# Patient Record
Sex: FEMALE | Race: WHITE | Employment: OTHER | URBAN - METROPOLITAN AREA
[De-identification: names, ages, dates, MRNs, and addresses within clinical notes are randomized per-mention and may not be internally consistent; named-entity substitution may affect disease eponyms.]

---

## 2018-09-18 ENCOUNTER — APPOINTMENT (OUTPATIENT)
Dept: NON INVASIVE DIAGNOSTICS | Age: 68
End: 2018-09-18
Attending: INTERNAL MEDICINE
Payer: MEDICARE

## 2018-09-18 ENCOUNTER — HOSPITAL ENCOUNTER (OUTPATIENT)
Dept: NON INVASIVE DIAGNOSTICS | Age: 68
Discharge: HOME | End: 2018-09-18
Attending: INTERNAL MEDICINE
Payer: MEDICARE

## 2018-09-18 DIAGNOSIS — R94.31 ABNORMAL EKG: ICD-10-CM

## 2018-09-18 LAB
ATTENDING PHYSICIAN, CST07: NORMAL
DIAGNOSIS, 93000: NORMAL
DUKE TM SCORE RESULT, CST14: NORMAL
DUKE TREADMILL SCORE, CST13: 5456
ECG INTERP BEFORE EX, CST11: NORMAL
ECG INTERP DURING EX, CST12: NORMAL
FUNCTIONAL CAPACITY, CST17: NORMAL
KNOWN CARDIAC CONDITION, CST08: NORMAL
MAX. DIASTOLIC BP, CST04: 80 MMHG
MAX. HEART RATE, CST05: 136 BPM
MAX. SYSTOLIC BP, CST03: 168 MMHG
OVERALL BP RESPONSE TO EXERCISE, CST16: NORMAL
OVERALL HR RESPONSE TO EXERCISE, CST15: NORMAL
PEAK EX METS, CST10: 6.3 METS
PROTOCOL NAME, CST01: NORMAL
REASON FOR TERMINATION: 86 BPM
TEST INDICATION, CST09: NORMAL
TIME IN EXERCISE PHASE, CST02: NORMAL

## 2018-09-18 PROCEDURE — 93351 STRESS TTE COMPLETE: CPT

## 2020-01-14 ENCOUNTER — HOSPITAL ENCOUNTER (OUTPATIENT)
Dept: MRI IMAGING | Age: 70
Discharge: HOME | End: 2020-01-14
Attending: INTERNAL MEDICINE
Payer: MEDICARE

## 2020-01-14 DIAGNOSIS — R41.3 MEMORY LOSS: ICD-10-CM

## 2020-01-14 DIAGNOSIS — S09.90XS PERSONALITY CHANGE DUE TO HEAD INJURY: ICD-10-CM

## 2020-01-14 DIAGNOSIS — F07.0 PERSONALITY CHANGE DUE TO HEAD INJURY: ICD-10-CM

## 2020-01-14 PROCEDURE — 70551 MRI BRAIN STEM W/O DYE: CPT

## 2020-02-06 ENCOUNTER — TELEPHONE (OUTPATIENT)
Dept: NEUROLOGY | Age: 70
End: 2020-02-06

## 2020-02-06 NOTE — TELEPHONE ENCOUNTER
----- Message from Casey Owens sent at 2/6/2020 10:58 AM EST -----  Regarding: [facility] /MD/TELEPHONE  Contact: [phone]  Caller's first and last name: Baljinder Favorite (daughter)  Reason for call: N/A  Callback required yes/no and why: Yes  Best contact number(s): [phone]  Details to clarify the request: Daughter  would like to discuss a letter written to Dr. Uziel Arizmendi from Dr. Rober Mark.

## 2020-02-11 ENCOUNTER — OFFICE VISIT (OUTPATIENT)
Dept: NEUROLOGY | Age: 70
End: 2020-02-11

## 2020-02-11 VITALS
DIASTOLIC BLOOD PRESSURE: 60 MMHG | HEART RATE: 74 BPM | SYSTOLIC BLOOD PRESSURE: 108 MMHG | WEIGHT: 162 LBS | HEIGHT: 66 IN | OXYGEN SATURATION: 98 % | BODY MASS INDEX: 26.03 KG/M2

## 2020-02-11 DIAGNOSIS — F68.8 PERSONALITY CHANGE: ICD-10-CM

## 2020-02-11 DIAGNOSIS — G93.40 ENCEPHALOPATHY: Primary | ICD-10-CM

## 2020-02-11 DIAGNOSIS — I67.9 SMALL VESSEL DISEASE, CEREBROVASCULAR: ICD-10-CM

## 2020-02-11 DIAGNOSIS — Z87.820 HISTORY OF CLOSED HEAD INJURY: ICD-10-CM

## 2020-02-11 RX ORDER — QUETIAPINE FUMARATE 25 MG/1
TABLET, FILM COATED ORAL
Qty: 60 TAB | Refills: 2 | Status: SHIPPED | OUTPATIENT
Start: 2020-02-11 | End: 2020-02-28 | Stop reason: SDUPTHER

## 2020-02-11 RX ORDER — AZITHROMYCIN 250 MG/1
TABLET, FILM COATED ORAL
COMMUNITY
Start: 2020-01-14 | End: 2020-02-12

## 2020-02-11 RX ORDER — LEVOTHYROXINE SODIUM 100 UG/1
TABLET ORAL
COMMUNITY
Start: 2020-01-11

## 2020-02-11 RX ORDER — DIMENHYDRINATE 50 MG
50 TABLET ORAL
COMMUNITY

## 2020-02-11 RX ORDER — IBUPROFEN 200 MG
TABLET ORAL
COMMUNITY

## 2020-02-11 RX ORDER — ASPIRIN 325 MG
325 TABLET ORAL DAILY
COMMUNITY

## 2020-02-11 RX ORDER — TRAMADOL HYDROCHLORIDE 50 MG/1
TABLET ORAL
COMMUNITY
Start: 2020-02-06

## 2020-02-11 RX ORDER — ZOLPIDEM TARTRATE 10 MG/1
TABLET ORAL
COMMUNITY
Start: 2019-12-31

## 2020-02-11 RX ORDER — DOXYCYCLINE 100 MG/1
CAPSULE ORAL
COMMUNITY
Start: 2020-01-23

## 2020-02-11 RX ORDER — MEMANTINE HYDROCHLORIDE 5 MG/1
TABLET ORAL
COMMUNITY
Start: 2020-01-16 | End: 2020-02-12

## 2020-02-11 RX ORDER — LISINOPRIL AND HYDROCHLOROTHIAZIDE 20; 25 MG/1; MG/1
TABLET ORAL
COMMUNITY
Start: 2020-02-06

## 2020-02-11 NOTE — PATIENT INSTRUCTIONS
The plan is working to get you set up with Dr. Warden Salmeron who is a nurse neuropsychologist to further evaluate how your brain is talking to each other and how was talking to each other in the context of stress, closed head injury, and small vessel disease    To help you relax sleep a little bit better and to take the edginess off work and a try off label low-dose Seroquel 25 mg 1 to 2 tablets at bedtime alternately you can take 1 in the morning and 1 at bedtime    Finally we will try to convince your insurance company that we need a PET scan to further determine the extent of what is going on since this is abstract and difficult to put a handle on    Office Policies    o Phone calls/patient messages:  Please allow up to 24 hours for someone in the office to contact you about your call or message. Be mindful your provider may be out of the office or your message may require further review. We encourage you to use [employer] for your messages as this is a faster, more efficient way to communicate with our office    o Medication Refills:  Prescription medications require up to 48 business hours to process. We encourage you to use [employer] for your refills. For controlled medications: Please allow up to 72 business hours to process. Certain medications may require you to  a written prescription at our office. NO narcotic/controlled medications will be prescribed after 4pm Monday through Friday or on weekends    o Form/Paperwork Completion:  We ask that you allow 7-14 business days. You may also download your forms to [employer] to have your doctor print off.
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
## 2020-02-25 ENCOUNTER — TELEPHONE (OUTPATIENT)
Dept: NEUROLOGY | Age: 70
End: 2020-02-25

## 2020-02-27 ENCOUNTER — OFFICE VISIT (OUTPATIENT)
Dept: NEUROLOGY | Age: 70
End: 2020-02-27

## 2020-02-27 DIAGNOSIS — F31.10 MANIC BIPOLAR I DISORDER (HCC): Primary | ICD-10-CM

## 2020-02-27 DIAGNOSIS — G93.40 ENCEPHALOPATHY: ICD-10-CM

## 2020-02-27 NOTE — PROGRESS NOTES
This note will not be viewable in [address] [facility] at [address]    Office:  [phone]  Fax: [phone]                 Initial Office Exam    Patient Name: Albina Ceballos  Age: 68 y.o. Gender: female   Occupation: Unemployed  Handedness: right handed   Presenting Concern: Acute personality change  Primary Care Physician: Karl Becerra MD  Referring Provider: Carmelo Louie, NP      REASON FOR REFERRAL:  This comprehensive and medically necessary neuropsychological assessment was requested to assist with a differential diagnosis of psychotic depression vs vascular dementia. The use and purpose of this examination, as well as the extent and limitations of confidentiality, were explained prior to obtaining permission to participate. Instructions were provided regarding the necessity to put forth optimal effort and answer questions truthfully in order to obtain reliable and accurate test results. PERTINENT HISTORY:  Ms. Philip Pate presented for a neuropsychological assessment at the recommendation of her treating physician secondary to complaints of personality change over the past few months. She has reported symptoms that include increased emotionality, thoughts racing, and thought dysfunction. She is reporting increased stress levels. Ms. Philip Pate acknowledged symptoms that are consistent with manic behavior. She has reported a number of concussive injuries, but without specific details. A recent MRI study shows evidence of progression of nonspecific white matter disease. From a brief review of her medical and personal history there has not been any other significant neurological injury or illness noted or reported.   She did not report experiencing depression or anxiety in the past. Ms. Audra Yo does not  report any problems at birth or difficulties meeting developmental milestones. She reports that she had an adequate level of family support and was not subject to trauma or abuse as a child. Ms. Audra Yo does not  report being retain in school or receiving special assistance in any of she classes or subjects. Ms. Audra Yo completed 12 + years of education. Ms. Audra Yo does not  exercise on a regular basis and does not  maintain a balanced diet. She does  report problems with sleep and does  complain of pain. She does  participate in mentally stimulating activities. Ms. Audra Yo does  Report a number of ill-defined stressors. Ms. Audra Yo indicated that she is independent in her instrumental activities of daily living, including shopping, meal preparation, housekeeping, doing laundry, driving a car, managing medications, and finances. Current Outpatient Medications   Medication Sig    doxycycline (VIBRAMYCIN) 100 mg capsule TAKE 1 CAPSULE BY MOUTH TWICE A DAY    levothyroxine (SYNTHROID) 100 mcg tablet TAKE 1 TABLET BY MOUTH EVERY DAY    lisinopril-hydroCHLOROthiazide (PRINZIDE, ZESTORETIC) 20-25 mg per tablet TAKE 1 TABLET BY MOUTH EVERY DAY    traMADol (ULTRAM) 50 mg tablet     zolpidem (AMBIEN) 10 mg tablet TAKE 1 TABLET BY MOUTH AT BEDTIME    QUEtiapine (SEROQUEL) 25 mg tablet 1-2 q hs    ibuprofen (MOTRIN) 200 mg tablet Take  by mouth.  dimenhyDRINATE (DRAMAMINE) 50 mg tablet Take 50 mg by mouth nightly as needed.  aspirin (ASPIRIN) 325 mg tablet Take 325 mg by mouth daily. No current facility-administered medications for this visit. Past Medical History:   Diagnosis Date    Headache     Joint pain        No flowsheet data found. No data recorded    No past surgical history on file.     Social History     Socioeconomic History    Marital status: UNKNOWN     Spouse name: Not on file    Number of children: Not on file    Years of education: Not on file  Highest education level: Not on file   Tobacco Use    Smoking status: Current Every Day Smoker   Substance and Sexual Activity    Alcohol use: Yes       No family history on file. CT Results (most recent): MRI Results (most recent):  Results from [place] encounter on 01/14/20   MRI BRAIN WO CONT    Narrative EXAM: MRI BRAIN WO CONT    INDICATION: Personality change due to head injury    COMPARISON: 2009    CONTRAST: None. TECHNIQUE:    Multiplanar multisequence acquisition without contrast of the brain. FINDINGS:  Diffusion imaging does not show acute ischemic changes. There is no extra-axial fluid collection hemorrhage or shift. Progression of nonspecific white matter disease since the prior examination. Ventricle size is stable. Flow voids in major vessels at the base of the brain are present. No mass. Impression IMPRESSION: No acute findings no mass. Progression of nonspecific white matter  disease since the prior examination. MENTAL STATUS:    Orientation:  Fully oriented   Eye Contact:  Appropriate   Motor Behavior:   Ambulates independently   Speech:   Rapid, tangential, circumstantial   Thought Process: Racing thoughts, Flight of ideas   Thought Content: No evidence of hallucinations or delusions   Suicidal ideations:  Denies   Mood:   Euphoric   Affect:   Elevated   Concentration:   Poor   Abstraction:   Adequate   Insight:   Limited     On the Modified Mini-Mental Status Exam: 98/100 (WNL)      DIAGNOSTIC IMPRESSIONS:    ICD-10-CM ICD-9-CM    1. Manic bipolar I disorder ([facility] F31.10 296.40      Her symptoms of tre include: elevated mood, inflated self-esteem, decreased need for sleep, racing thoughts, difficulty maintaining attention,and increase in goal-directed activity. PLAN:  1.  Complete a comprehensive neuropsychological assessment to provide a differential diagnosis of presenting concerns as well as to assist with disposition and treatment planning as appropriate. 2. Consider compensatory and remedial cognitive training. 3. Consider referral to psychiatry and psychotherapy. 73328 x 1 Review of records. Face to face interview w/ patient. Determine test protocol: 60 minutes. Total 1 unit  34431 continuation of service      Suresh Lawrence, PhD, ABPP, LCP  Licensed Clinical Psychologist/ Neuropsychologist        This note will not be viewable in [address]

## 2020-02-27 NOTE — TELEPHONE ENCOUNTER
Patients daughter Riky Miller came into clinic today she would like to have the seroquel sent to the [address] on file.  Pt had a visit today with Nadia Jc who ask how the seroquel is working and she never started it due to her mail order pharmacy requesting some type of account

## 2020-02-27 NOTE — LETTER
2/27/20 Patient: Panchito Stanford YOB: 1950 Date of Visit: 2/27/2020 Naima Sanchez MD 
[id number] [facility] [address] [id number] VIA Facsimile: [phone] Isela Patton NP 
[address] [facility] [address] [address] VIA In Basket Dear MD Isela Flannery NP, Thank you for referring Ms. Jojo Leslie to [address] [place] [address] for evaluation. My notes for this consultation are attached. This note will not be viewable in [address] [facility] at [address] Medical Office [address] Office:  [phone]  Fax: [phone] Initial Office Exam 
 
Patient Name: Panchito Stanford Age: 68 y.o. Gender: female Occupation: Unemployed Handedness: right handed Presenting Concern: Acute personality change Primary Care Physician: Curt Bustillo MD 
Referring Provider: Coy Mathis NP 
 
 
REASON FOR REFERRAL: 
This comprehensive and medically necessary neuropsychological assessment was requested to assist with a differential diagnosis of psychotic depression vs vascular dementia. The use and purpose of this examination, as well as the extent and limitations of confidentiality, were explained prior to obtaining permission to participate. Instructions were provided regarding the necessity to put forth optimal effort and answer questions truthfully in order to obtain reliable and accurate test results. PERTINENT HISTORY: 
Ms. Audra Yo presented for a neuropsychological assessment at the recommendation of her treating physician secondary to complaints of personality change over the past few months.   She has reported symptoms that include increased emotionality, thoughts racing, and thought dysfunction. She is reporting increased stress levels. Ms. Romelia Garvey acknowledged symptoms that are consistent with manic behavior. She has reported a number of concussive injuries, but without specific details. A recent MRI study shows evidence of progression of nonspecific white matter disease. From a brief review of her medical and personal history there has not been any other significant neurological injury or illness noted or reported. She did not report experiencing depression or anxiety in the past.   
 
Ms. Romelia Garvey does not  report any problems at birth or difficulties meeting developmental milestones. She reports that she had an adequate level of family support and was not subject to trauma or abuse as a child. Ms. Romelia Garvey does not  report being retain in school or receiving special assistance in any of she classes or subjects. Ms. Romelia Garvey completed 12 + years of education. Ms. Romelia Garvey does not  exercise on a regular basis and does not  maintain a balanced diet. She does  report problems with sleep and does  complain of pain. She does  participate in mentally stimulating activities. Ms. Romelia Garvey does  Report a number of ill-defined stressors. Ms. Romelia Garvey indicated that she is independent in her instrumental activities of daily living, including shopping, meal preparation, housekeeping, doing laundry, driving a car, managing medications, and finances. Current Outpatient Medications Medication Sig  
 doxycycline (VIBRAMYCIN) 100 mg capsule TAKE 1 CAPSULE BY MOUTH TWICE A DAY  levothyroxine (SYNTHROID) 100 mcg tablet TAKE 1 TABLET BY MOUTH EVERY DAY  lisinopril-hydroCHLOROthiazide (PRINZIDE, ZESTORETIC) 20-25 mg per tablet TAKE 1 TABLET BY MOUTH EVERY DAY  traMADol (ULTRAM) 50 mg tablet  zolpidem (AMBIEN) 10 mg tablet TAKE 1 TABLET BY MOUTH AT BEDTIME  
 QUEtiapine (SEROQUEL) 25 mg tablet 1-2 q hs  ibuprofen (MOTRIN) 200 mg tablet Take  by mouth.  dimenhyDRINATE (DRAMAMINE) 50 mg tablet Take 50 mg by mouth nightly as needed.  aspirin (ASPIRIN) 325 mg tablet Take 325 mg by mouth daily. No current facility-administered medications for this visit. Past Medical History:  
Diagnosis Date  Headache  Joint pain No flowsheet data found. No data recorded No past surgical history on file. Social History Socioeconomic History  Marital status: UNKNOWN Spouse name: Not on file  Number of children: Not on file  Years of education: Not on file  Highest education level: Not on file Tobacco Use  Smoking status: Current Every Day Smoker Substance and Sexual Activity  Alcohol use: Yes No family history on file. CT Results (most recent): MRI Results (most recent): 
Results from Hospital Encounter encounter on 01/14/20 MRI BRAIN WO CONT Narrative EXAM: MRI BRAIN WO CONT INDICATION: Personality change due to head injury COMPARISON: 2009 CONTRAST: None. TECHNIQUE:   
Multiplanar multisequence acquisition without contrast of the brain. FINDINGS: 
Diffusion imaging does not show acute ischemic changes. There is no extra-axial fluid collection hemorrhage or shift. Progression of nonspecific white matter disease since the prior examination. Ventricle size is stable. Flow voids in major vessels at the base of the brain are present. No mass. Impression IMPRESSION: No acute findings no mass. Progression of nonspecific white matter 
disease since the prior examination. MENTAL STATUS: 
 
Orientation:  Fully oriented Eye Contact:  Appropriate Motor Behavior:   Ambulates independently Speech:   Rapid, tangential, circumstantial  
Thought Process: Racing thoughts, Flight of ideas Thought Content: No evidence of hallucinations or delusions Suicidal ideations:  Denies Mood:   Euphoric Affect:   Elevated Concentration:   Poor Abstraction:   Adequate Insight:   Limited On the Modified Mini-Mental Status Exam: 98/100 (WNL) DIAGNOSTIC IMPRESSIONS: 
  ICD-10-CM ICD-9-CM 1. Manic bipolar I disorder ([facility] [address] F31.10 296.40 Her symptoms of tre include: elevated mood, inflated self-esteem, decreased need for sleep, racing thoughts, difficulty maintaining attention,and increase in goal-directed activity. PLAN: 
1. Complete a comprehensive neuropsychological assessment to provide a differential diagnosis of presenting concerns as well as to assist with disposition and treatment planning as appropriate. 2. Consider compensatory and remedial cognitive training. 3. Consider referral to psychiatry and psychotherapy. 69299 x 1 Review of records. Face to face interview w/ patient. Determine test protocol: 60 minutes. Total 1 unit 26772 continuation of service Dione Hughes, PhD, ABPP, LCP Licensed Clinical Psychologist/ Neuropsychologist 
 
 
 
This note will not be viewable in [address] If you have questions, please do not hesitate to call me. I look forward to following your patient along with you.  
 
 
Sincerely, 
 
Dione Hughes, PHD

## 2020-02-28 ENCOUNTER — TELEPHONE (OUTPATIENT)
Dept: NEUROLOGY | Age: 70
End: 2020-02-28

## 2020-02-28 RX ORDER — QUETIAPINE FUMARATE 25 MG/1
TABLET, FILM COATED ORAL
Qty: 60 TAB | Refills: 2 | Status: SHIPPED | OUTPATIENT
Start: 2020-02-28

## 2020-02-28 NOTE — TELEPHONE ENCOUNTER
Patients daughter called to k on the status of the seroquel rx.  She would like to know if this can be done today

## 2020-02-28 NOTE — TELEPHONE ENCOUNTER
Future Appointments   Date Time Provider Siva Bahena   2020  8:00 AM Berta Clay  [facility]                         Last Appointment My Department:  2020    Last filled: 2020  Please review and send in refill below if warranted. Prescription was orginally sent to [facility]. Patient's daughter Mervat Quach is requesting it be sent to their local pharmacy.     Requested Prescriptions     Pending Prescriptions Disp Refills    QUEtiapine (SEROQUEL) 25 mg tablet 60 Tab 2     Si-2 q hs   \

## 2020-03-03 ENCOUNTER — TELEPHONE (OUTPATIENT)
Dept: NEUROLOGY | Age: 70
End: 2020-03-03

## 2020-03-03 NOTE — TELEPHONE ENCOUNTER
I reached out to pt to schedule a testing appt and she was upset because of issues with her medicine and she kept saying she was now 2 weeks behind. She kept giving a bunch of clinical information/questions that I could not answer for her. She wants to know if she needs to take 1 or 2 seroquel bc 2 knock her out. She then told me she would only take 1 seroquel a day. I wanted to send you a message so she can be talked to about her medicine amounts prior to testing. She is scheduled for 03/31/2020.

## 2020-03-05 NOTE — TELEPHONE ENCOUNTER
Left voicemail with Gautam Drew, patient's daughter to see if Seroquel was received and confirmation on how patient is taking rx. Advised she can take 1-2 tabs a night, however, per Nancy's note if rx is making patient worse or not finding relief to give us a call.

## 2020-03-13 ENCOUNTER — APPOINTMENT (OUTPATIENT)
Dept: GENERAL RADIOLOGY | Age: 70
End: 2020-03-13
Attending: EMERGENCY MEDICINE
Payer: MEDICARE

## 2020-03-13 ENCOUNTER — HOSPITAL ENCOUNTER (EMERGENCY)
Age: 70
Discharge: HOME | End: 2020-03-13
Attending: EMERGENCY MEDICINE
Payer: MEDICARE

## 2020-03-13 ENCOUNTER — TELEPHONE (OUTPATIENT)
Dept: NEUROLOGY | Age: 70
End: 2020-03-13

## 2020-03-13 VITALS
TEMPERATURE: 97.8 F | OXYGEN SATURATION: 100 % | DIASTOLIC BLOOD PRESSURE: 64 MMHG | RESPIRATION RATE: 16 BRPM | HEART RATE: 85 BPM | SYSTOLIC BLOOD PRESSURE: 150 MMHG | WEIGHT: 161.82 LBS | BODY MASS INDEX: 26.96 KG/M2 | HEIGHT: 65 IN

## 2020-03-13 DIAGNOSIS — M11.20 CHONDROCALCINOSIS: Primary | ICD-10-CM

## 2020-03-13 DIAGNOSIS — M79.89 SWELLING OF RIGHT HAND: ICD-10-CM

## 2020-03-13 PROCEDURE — 73110 X-RAY EXAM OF WRIST: CPT

## 2020-03-13 PROCEDURE — 99283 EMERGENCY DEPT VISIT LOW MDM: CPT

## 2020-03-13 RX ORDER — IBUPROFEN 600 MG/1
600 TABLET ORAL
Qty: 20 TAB | Refills: 0 | Status: SHIPPED | OUTPATIENT
Start: 2020-03-13

## 2020-03-13 RX ORDER — COLCHICINE 0.6 MG/1
TABLET ORAL
Qty: 3 TAB | Refills: 0 | Status: SHIPPED | OUTPATIENT
Start: 2020-03-13

## 2020-03-13 NOTE — ED PROVIDER NOTES
EMERGENCY DEPARTMENT HISTORY AND PHYSICAL EXAM      Date: 3/13/2020  Patient Name: Yue Delgadillo    History of Presenting Illness     Chief Complaint   Patient presents with    Wrist Pain     Since monday. Pt twisted masters wrist and now has pain and swelling. History Provided By: Patient    HPI: Yue Delgadillo, 68 y.o. female with PMHx significant for joint pain, headache. Patient presents emergency department with right hand pain and swelling that is been persistent over the past several days. She has had an episode of this previously and thought it might of been a flareup of potential gout however she generally has it in her right big foot. She does state that she had a quick twisting motion of her right hand and thinks that that might have been what has caused her pain. She does have some difficulty lifting objects with her right hand and she is right-handed. She denies any acute trauma or falls other than the quick twisting motion with her right hand of trying to get something open. She denies any chest pain, shortness of breath, fever, abdominal pain, flank pain or any  symptoms. Also denies any acute history of immunocompromising disease. Please note for examination and HPI were completed I did introduce myself as the physician assistant. Please note that this dictation was completed with [employer], the computer voice recognition software. Quite often unanticipated grammatical, syntax, homophones, and other interpretive errors are inadvertently transcribed by the computer software. Please disregard these errors. Please excuse any errors that have escaped final proofreading. For further clarification on any chart please contact myself. Thank you. There are no other complaints, changes, or physical findings at this time. PCP: Larae Klinefelter, MD    No current facility-administered medications on file prior to encounter.       Current Outpatient Medications on File Prior to Encounter   Medication Sig Dispense Refill    QUEtiapine (SEROQUEL) 25 mg tablet 1-2 q hs 60 Tab 2    doxycycline (VIBRAMYCIN) 100 mg capsule TAKE 1 CAPSULE BY MOUTH TWICE A DAY      levothyroxine (SYNTHROID) 100 mcg tablet TAKE 1 TABLET BY MOUTH EVERY DAY      lisinopril-hydroCHLOROthiazide (PRINZIDE, ZESTORETIC) 20-25 mg per tablet TAKE 1 TABLET BY MOUTH EVERY DAY      traMADol (ULTRAM) 50 mg tablet       zolpidem (AMBIEN) 10 mg tablet TAKE 1 TABLET BY MOUTH AT BEDTIME      ibuprofen (MOTRIN) 200 mg tablet Take  by mouth.  dimenhyDRINATE (DRAMAMINE) 50 mg tablet Take 50 mg by mouth nightly as needed.  aspirin (ASPIRIN) 325 mg tablet Take 325 mg by mouth daily. Past History     Past Medical History:  Past Medical History:   Diagnosis Date    Arthritis     Headache     Ill-defined condition     gout    Joint pain        Past Surgical History:  Past Surgical History:   Procedure Laterality Date    HX ORTHOPAEDIC         Family History:  History reviewed. No pertinent family history. Social History:  Social History     Tobacco Use    Smoking status: Light Tobacco Smoker    Smokeless tobacco: Never Used   Substance Use Topics    Alcohol use: Not Currently    Drug use: Never       Allergies: Allergies   Allergen Reactions    Claritin [Loratadine] Other (comments)    Iodine Other (comments)    Mold Other (comments)     Inner ear     Mucinex [Guaifenesin] Other (comments)    Other Medication Other (comments)     Tumeric, gasoline, solvents    Pollen Extracts Other (comments)    Shellfish Derived Other (comments)    Wheat Other (comments)    Yeast, Dried Other (comments)         Review of Systems   Review of Systems   Musculoskeletal: Positive for arthralgias and joint swelling. All other systems reviewed and are negative. Physical Exam   Physical Exam  Vitals signs and nursing note reviewed. Constitutional:       Appearance: She is well-developed.    HENT:      Head: Normocephalic and atraumatic. Eyes:      Conjunctiva/sclera: Conjunctivae normal.      Pupils: Pupils are equal, round, and reactive to light. Neck:      Musculoskeletal: Normal range of motion and neck supple. Thyroid: No thyromegaly. Cardiovascular:      Rate and Rhythm: Normal rate and regular rhythm. Heart sounds: Normal heart sounds. No murmur. Pulmonary:      Effort: Pulmonary effort is normal. No respiratory distress. Breath sounds: Normal breath sounds. No stridor. No wheezing. Abdominal:      General: Bowel sounds are normal.      Palpations: Abdomen is soft. Tenderness: There is no abdominal tenderness. Musculoskeletal: Normal range of motion. General: No tenderness. Lymphadenopathy:      Cervical: No cervical adenopathy. Skin:     General: Skin is warm. Neurological:      Mental Status: She is alert and oriented to person, place, and time. Deep Tendon Reflexes: Reflexes are normal and symmetric. Psychiatric:         Judgment: Judgment normal.         Diagnostic Study Results     Labs -   No results found for this or any previous visit (from the past 12 hour(s)). Radiologic Studies -   XR WRIST RT AP/LAT/OBL MIN 3V   Final Result   IMPRESSION:    1. No acute abnormality. 2. Severe first CMC degenerative disease. 3. Chondrocalcinosis. CT Results  (Last 48 hours)    None        CXR Results  (Last 48 hours)    None            Medical Decision Making   I am the first provider for this patient. I reviewed the vital signs, available nursing notes, past medical history, past surgical history, family history and social history. Vital Signs-Reviewed the patient's vital signs.   Patient Vitals for the past 12 hrs:   Temp Pulse Resp BP SpO2   03/13/20 1305 97.8 °F (36.6 °C) 85 16 150/64 100 %       Records Reviewed: Nursing Notes    Provider Notes (Medical Decision Making):   Patient presents emergency department with right hand pain and swelling she states that she generally has gout in her right big toe however this seems like something different after she had a quick twisting motion. After taking x-ray there is concerning signs for potential pseudogout however there is no acute injury suggestive for fracture. I did advise patient to follow-up with rheumatologist/orthopedic hand specialist or primary care specialist and gave him anti-inflammatories, colchicine for any acute outbreaks and did give him instructions for outpatient follow-up. At this time low clinical suspicion for any acute cellulitis, IV drug use, abscess formation noted at this time. ED Course:   Initial assessment performed. The patients presenting problems have been discussed, and they are in agreement with the care plan formulated and outlined with them. I have encouraged them to ask questions as they arise throughout their visit. Critical Care Time: None    Disposition:  Dispo     PLAN:  1. Discharge Medication List as of 3/13/2020  2:44 PM      START taking these medications    Details   !! ibuprofen (MOTRIN) 600 mg tablet Take 1 Tab by mouth every six (6) hours as needed for Pain., Normal, Disp-20 Tab, R-0      colchicine 0.6 mg tablet Take two tablets by mouth immediately and then take 1 tablet on hour later., Normal, Disp-3 Tab, R-0       !! - Potential duplicate medications found. Please discuss with provider.       CONTINUE these medications which have NOT CHANGED    Details   QUEtiapine (SEROQUEL) 25 mg tablet 1-2 q hs, Normal, Disp-60 Tab, R-2      doxycycline (VIBRAMYCIN) 100 mg capsule TAKE 1 CAPSULE BY MOUTH TWICE A DAY, Historical Med      levothyroxine (SYNTHROID) 100 mcg tablet TAKE 1 TABLET BY MOUTH EVERY DAY, Historical Med      lisinopril-hydroCHLOROthiazide (PRINZIDE, ZESTORETIC) 20-25 mg per tablet TAKE 1 TABLET BY MOUTH EVERY DAY, Historical Med      traMADol (ULTRAM) 50 mg tablet Historical Med      zolpidem (AMBIEN) 10 mg tablet TAKE 1 TABLET BY MOUTH AT BEDTIME, Historical Med      !! ibuprofen (MOTRIN) 200 mg tablet Take  by mouth., Historical Med      dimenhyDRINATE (DRAMAMINE) 50 mg tablet Take 50 mg by mouth nightly as needed., Historical Med      aspirin (ASPIRIN) 325 mg tablet Take 325 mg by mouth daily. , Historical Med       !! - Potential duplicate medications found. Please discuss with provider. 2.   Follow-up Information     Follow up With Specialties Details Why Contact Info    [facility]    [facility]  [address]  [facility] [address]   [address] [facility]    Tammy Muniz MD Internal Medicine   [address] [facility] [address]  [address] [place] [address] [address] [facility]  [phone]          Return to ED if worse     Diagnosis     Clinical Impression:   1. Chondrocalcinosis    2. Swelling of right hand          Please note that this dictation was completed with [employer], the computer voice recognition software. Quite often unanticipated grammatical, syntax, homophones, and other interpretive errors are inadvertently transcribed by the computer software. Please disregards these errors. Please excuse any errors that have escaped final proofreading. This note will not be viewable in [address]

## 2020-03-13 NOTE — DISCHARGE INSTRUCTIONS
Patient Education        Learning About Pseudogout  What is it? Pseudogout is a type of arthritis. It causes pain, redness, heat, and swelling in the joints. What causes it? When you have pseudogout, tiny calcium crystals form in your joint, most often the knee. Over time, these crystals may damage the cartilage of the joint. As this happens, the bones rub together and cause joint pain. What are the symptoms? Symptoms include pain, redness, heat, and swelling in joints, usually the knee. Symptoms usually begin quickly. Pain and swelling can last days or weeks. For some people, these symptoms are an ongoing problem. Others may just have flare-ups now and then. Call your doctor if you get a fever or treatment isn't helping. How is it diagnosed? Your doctor will ask questions about your symptoms and past health. He or she will do a physical exam and blood tests. Your doctor may also take a sample of fluid from your joint to look for crystals. And you may have imaging tests, such as an X-ray, to look for joint damage. How is it treated? The goal of treatment is to relieve symptoms and protect your joint. The type of treatment depends on how often you have symptoms and how bad they are. For some people who have flare-ups now and then, over-the-counter medicines can help. To reduce pain and swelling, your doctor may remove some of the fluid from the joint. And he or she may also give injections into the joint. Resting the joint and using ice or cold packs on the area for 10 to 20 minutes at a time can also help. Other people may need prescription medicines to treat their symptoms or to help keep symptoms from coming back. And people who have ongoing problems may need to take a daily medicine. Follow-up care is a key part of your treatment and safety. Be sure to make and go to all appointments, and call your doctor if you are having problems.  It's also a good idea to know your test results and keep a list of the medicines you take. Where can you learn more? Go to http://patric-aniyah.info/  Enter P255 in the search box to learn more about \"Learning About Pseudogout. \"  Current as of: December 8, 2019Content Version: 12.4  © 1507-9976 Healthwise, Incorporated. Care instructions adapted under license by [employer] (which disclaims liability or warranty for this information). If you have questions about a medical condition or this instruction, always ask your healthcare professional. [address] any warranty or liability for your use of this information.

## 2020-03-13 NOTE — ED NOTES
DEBI Atkins at bedside to provide discharge paperwork. Vital signs stable. Pt in no apparent distress at this time. Mental status at baseline. Ambulatory to waiting room with steady gate, discharge paperwork in hand. Patient and or family acknowledged and signed discharge instructions that were created/provided by the Physician. Signed discharge form with patient label placed in scan box.

## 2020-03-26 ENCOUNTER — TELEPHONE (OUTPATIENT)
Dept: NEUROLOGY | Age: 70
End: 2020-03-26

## 2020-03-26 NOTE — TELEPHONE ENCOUNTER
----- Message from Trevor Norton Page sent at 3/26/2020 10:32 AM EDT -----  Regarding: Telephone  Appointment not available:     : 94-   ID numbers:  [id number]- [id number]  Caller's first and last name and relationship to patient (if not the patient): n/a   Best contact [phone]  Preferred date and time:  Any day ithe end of April  Scheduled appointment date and time:  2020  08:00 AM  Reason for appointment:     ischemic vessel disease  Details to clarify the request: Patient was advised to reschedule by her internist. Could not reschedule per guidelines

## 2020-03-30 NOTE — TELEPHONE ENCOUNTER
She was set up and saw Lane Nunez 2/2020. Cancelled her appointment as it is not needed at this time.   She will reschedule for Dr Ivy Thomas after pandemic is over

## 2020-05-26 ENCOUNTER — HOSPITAL ENCOUNTER (OUTPATIENT)
Dept: GENERAL RADIOLOGY | Age: 70
Discharge: HOME | End: 2020-05-26
Payer: MEDICARE

## 2020-05-26 DIAGNOSIS — Z79.891 LONG TERM PRESCRIPTION OPIATE USE: ICD-10-CM

## 2020-05-26 DIAGNOSIS — M19.019: ICD-10-CM

## 2020-05-26 DIAGNOSIS — M47.812 CERVICAL SPONDYLOARTHRITIS: ICD-10-CM

## 2020-05-26 DIAGNOSIS — Z96.642 PRESENCE OF LEFT ARTIFICIAL HIP JOINT: ICD-10-CM

## 2020-05-26 PROCEDURE — 73030 X-RAY EXAM OF SHOULDER: CPT

## 2022-09-01 ENCOUNTER — TRANSCRIBE ORDER (OUTPATIENT)
Dept: SCHEDULING | Age: 72
End: 2022-09-01

## 2022-09-01 DIAGNOSIS — M54.50 LOWER BACK PAIN: ICD-10-CM

## 2022-09-01 DIAGNOSIS — R31.9 HEMATURIA, UNSPECIFIED: Primary | ICD-10-CM

## 2022-09-02 ENCOUNTER — HOSPITAL ENCOUNTER (OUTPATIENT)
Dept: CT IMAGING | Age: 72
Discharge: HOME | End: 2022-09-02
Attending: INTERNAL MEDICINE
Payer: MEDICARE

## 2022-09-02 DIAGNOSIS — R31.9 HEMATURIA, UNSPECIFIED: ICD-10-CM

## 2022-09-02 DIAGNOSIS — M54.50 LOWER BACK PAIN: ICD-10-CM

## 2022-09-02 PROCEDURE — 74176 CT ABD & PELVIS W/O CONTRAST: CPT

## 2022-12-13 ENCOUNTER — TRANSCRIBE ORDER (OUTPATIENT)
Dept: GENERAL RADIOLOGY | Age: 72
End: 2022-12-13

## 2022-12-13 ENCOUNTER — HOSPITAL ENCOUNTER (OUTPATIENT)
Dept: GENERAL RADIOLOGY | Age: 72
Discharge: HOME | End: 2022-12-13
Attending: PAIN MEDICINE
Payer: MEDICARE

## 2022-12-13 DIAGNOSIS — M54.51 VERTEBROGENIC LOW BACK PAIN: ICD-10-CM

## 2022-12-13 DIAGNOSIS — M47.817 LUMBOSACRAL SPONDYLOSIS WITHOUT MYELOPATHY: ICD-10-CM

## 2022-12-13 DIAGNOSIS — M47.817 LUMBOSACRAL SPONDYLOSIS WITHOUT MYELOPATHY: Primary | ICD-10-CM

## 2022-12-13 PROCEDURE — 72110 X-RAY EXAM L-2 SPINE 4/>VWS: CPT

## 2023-05-24 ENCOUNTER — TELEPHONE (OUTPATIENT)
Age: 73
End: 2023-05-24

## 2023-05-27 ENCOUNTER — HOSPITAL ENCOUNTER (INPATIENT)
Facility: HOSPITAL | Age: 73
LOS: 4 days | Discharge: HOME | End: 2023-05-31
Attending: EMERGENCY MEDICINE | Admitting: FAMILY MEDICINE
Payer: MEDICARE

## 2023-05-27 ENCOUNTER — APPOINTMENT (OUTPATIENT)
Facility: HOSPITAL | Age: 73
End: 2023-05-27
Payer: MEDICARE

## 2023-05-27 DIAGNOSIS — U07.1 COVID: ICD-10-CM

## 2023-05-27 DIAGNOSIS — E03.9 HYPOTHYROIDISM, UNSPECIFIED TYPE: Primary | ICD-10-CM

## 2023-05-27 DIAGNOSIS — F30.9 MANIA (HCC): ICD-10-CM

## 2023-05-27 LAB
ALBUMIN SERPL-MCNC: 4 G/DL (ref 3.5–5)
ALBUMIN/GLOB SERPL: 1.1 (ref 1.1–2.2)
ALP SERPL-CCNC: 74 U/L (ref 45–117)
ALT SERPL-CCNC: 64 U/L (ref 12–78)
AMPHET UR QL SCN: NEGATIVE
ANION GAP SERPL CALC-SCNC: 6 MMOL/L (ref 5–15)
APAP SERPL-MCNC: 4 UG/ML (ref 10–30)
APPEARANCE UR: CLEAR
AST SERPL-CCNC: 69 U/L (ref 15–37)
BACTERIA URNS QL MICRO: NEGATIVE /HPF
BARBITURATES UR QL SCN: NEGATIVE
BASOPHILS # BLD: 0 K/UL (ref 0–0.1)
BASOPHILS NFR BLD: 0 % (ref 0–1)
BENZODIAZ UR QL: NEGATIVE
BILIRUB SERPL-MCNC: 0.7 MG/DL (ref 0.2–1)
BILIRUB UR QL: NEGATIVE
BUN SERPL-MCNC: 13 MG/DL (ref 6–20)
BUN/CREAT SERPL: 12 (ref 12–20)
CALCIUM SERPL-MCNC: 9.5 MG/DL (ref 8.5–10.1)
CANNABINOIDS UR QL SCN: NEGATIVE
CHLORIDE SERPL-SCNC: 105 MMOL/L (ref 97–108)
CO2 SERPL-SCNC: 27 MMOL/L (ref 21–32)
COCAINE UR QL SCN: NEGATIVE
COLOR UR: ABNORMAL
COMMENT:: NORMAL
CREAT SERPL-MCNC: 1.12 MG/DL (ref 0.55–1.02)
CRP SERPL-MCNC: <0.29 MG/DL (ref 0–0.6)
D DIMER PPP FEU-MCNC: 0.69 MG/L FEU (ref 0–0.65)
DIFFERENTIAL METHOD BLD: NORMAL
EOSINOPHIL # BLD: 0.1 K/UL (ref 0–0.4)
EOSINOPHIL NFR BLD: 1 % (ref 0–7)
EPITH CASTS URNS QL MICRO: ABNORMAL /LPF
ERYTHROCYTE [DISTWIDTH] IN BLOOD BY AUTOMATED COUNT: 13.2 % (ref 11.5–14.5)
ETHANOL SERPL-MCNC: <10 MG/DL (ref 0–0.08)
FERRITIN SERPL-MCNC: 328 NG/ML (ref 8–252)
FLUAV RNA SPEC QL NAA+PROBE: NOT DETECTED
FLUBV RNA SPEC QL NAA+PROBE: NOT DETECTED
GLOBULIN SER CALC-MCNC: 3.8 G/DL (ref 2–4)
GLUCOSE SERPL-MCNC: 110 MG/DL (ref 65–100)
GLUCOSE UR STRIP.AUTO-MCNC: NEGATIVE MG/DL
HCT VFR BLD AUTO: 42.3 % (ref 35–47)
HGB BLD-MCNC: 13.7 G/DL (ref 11.5–16)
HGB UR QL STRIP: ABNORMAL
HYALINE CASTS URNS QL MICRO: ABNORMAL /LPF (ref 0–5)
IMM GRANULOCYTES # BLD AUTO: 0 K/UL (ref 0–0.04)
IMM GRANULOCYTES NFR BLD AUTO: 0 % (ref 0–0.5)
KETONES UR QL STRIP.AUTO: NEGATIVE MG/DL
LACTATE SERPL-SCNC: 1.1 MMOL/L (ref 0.4–2)
LEUKOCYTE ESTERASE UR QL STRIP.AUTO: ABNORMAL
LYMPHOCYTES # BLD: 1.6 K/UL (ref 0.8–3.5)
LYMPHOCYTES NFR BLD: 23 % (ref 12–49)
Lab: NORMAL
MAGNESIUM SERPL-MCNC: 2.3 MG/DL (ref 1.6–2.4)
MCH RBC QN AUTO: 30 PG (ref 26–34)
MCHC RBC AUTO-ENTMCNC: 32.4 G/DL (ref 30–36.5)
MCV RBC AUTO: 92.6 FL (ref 80–99)
METHADONE UR QL: NEGATIVE
MONOCYTES # BLD: 0.4 K/UL (ref 0–1)
MONOCYTES NFR BLD: 6 % (ref 5–13)
NEUTS SEG # BLD: 4.7 K/UL (ref 1.8–8)
NEUTS SEG NFR BLD: 70 % (ref 32–75)
NITRITE UR QL STRIP.AUTO: NEGATIVE
NRBC # BLD: 0 K/UL (ref 0–0.01)
NRBC BLD-RTO: 0 PER 100 WBC
OPIATES UR QL: NEGATIVE
PCP UR QL: NEGATIVE
PH UR STRIP: 5.5 (ref 5–8)
PLATELET # BLD AUTO: 317 K/UL (ref 150–400)
PMV BLD AUTO: 9.9 FL (ref 8.9–12.9)
POTASSIUM SERPL-SCNC: 2.9 MMOL/L (ref 3.5–5.1)
PROT SERPL-MCNC: 7.8 G/DL (ref 6.4–8.2)
PROT UR STRIP-MCNC: ABNORMAL MG/DL
RBC # BLD AUTO: 4.57 M/UL (ref 3.8–5.2)
RBC #/AREA URNS HPF: ABNORMAL /HPF (ref 0–5)
SALICYLATES SERPL-MCNC: <1.7 MG/DL (ref 2.8–20)
SARS-COV-2 RNA RESP QL NAA+PROBE: DETECTED
SODIUM SERPL-SCNC: 138 MMOL/L (ref 136–145)
SP GR UR REFRACTOMETRY: 1.01 (ref 1–1.03)
SPECIMEN HOLD: NORMAL
T4 FREE SERPL-MCNC: 0.5 NG/DL (ref 0.8–1.5)
TROPONIN I SERPL HS-MCNC: 19 NG/L (ref 0–37)
TSH SERPL DL<=0.05 MIU/L-ACNC: 60.4 UIU/ML (ref 0.36–3.74)
UROBILINOGEN UR QL STRIP.AUTO: 0.2 EU/DL (ref 0.2–1)
WBC # BLD AUTO: 6.8 K/UL (ref 3.6–11)
WBC URNS QL MICRO: ABNORMAL /HPF (ref 0–4)

## 2023-05-27 PROCEDURE — 85025 COMPLETE CBC W/AUTO DIFF WBC: CPT

## 2023-05-27 PROCEDURE — 36415 COLL VENOUS BLD VENIPUNCTURE: CPT

## 2023-05-27 PROCEDURE — 93005 ELECTROCARDIOGRAM TRACING: CPT | Performed by: EMERGENCY MEDICINE

## 2023-05-27 PROCEDURE — 84443 ASSAY THYROID STIM HORMONE: CPT

## 2023-05-27 PROCEDURE — 82728 ASSAY OF FERRITIN: CPT

## 2023-05-27 PROCEDURE — 81001 URINALYSIS AUTO W/SCOPE: CPT

## 2023-05-27 PROCEDURE — 6360000002 HC RX W HCPCS: Performed by: FAMILY MEDICINE

## 2023-05-27 PROCEDURE — 87636 SARSCOV2 & INF A&B AMP PRB: CPT

## 2023-05-27 PROCEDURE — 70450 CT HEAD/BRAIN W/O DYE: CPT

## 2023-05-27 PROCEDURE — 87086 URINE CULTURE/COLONY COUNT: CPT

## 2023-05-27 PROCEDURE — 6370000000 HC RX 637 (ALT 250 FOR IP): Performed by: EMERGENCY MEDICINE

## 2023-05-27 PROCEDURE — 87077 CULTURE AEROBIC IDENTIFY: CPT

## 2023-05-27 PROCEDURE — 87040 BLOOD CULTURE FOR BACTERIA: CPT

## 2023-05-27 PROCEDURE — 84484 ASSAY OF TROPONIN QUANT: CPT

## 2023-05-27 PROCEDURE — 80179 DRUG ASSAY SALICYLATE: CPT

## 2023-05-27 PROCEDURE — 83735 ASSAY OF MAGNESIUM: CPT

## 2023-05-27 PROCEDURE — 71045 X-RAY EXAM CHEST 1 VIEW: CPT

## 2023-05-27 PROCEDURE — 1100000000 HC RM PRIVATE

## 2023-05-27 PROCEDURE — 2580000003 HC RX 258: Performed by: FAMILY MEDICINE

## 2023-05-27 PROCEDURE — 82077 ASSAY SPEC XCP UR&BREATH IA: CPT

## 2023-05-27 PROCEDURE — 2580000003 HC RX 258: Performed by: EMERGENCY MEDICINE

## 2023-05-27 PROCEDURE — 83605 ASSAY OF LACTIC ACID: CPT

## 2023-05-27 PROCEDURE — 87186 SC STD MICRODIL/AGAR DIL: CPT

## 2023-05-27 PROCEDURE — 80307 DRUG TEST PRSMV CHEM ANLYZR: CPT

## 2023-05-27 PROCEDURE — 85379 FIBRIN DEGRADATION QUANT: CPT

## 2023-05-27 PROCEDURE — 84439 ASSAY OF FREE THYROXINE: CPT

## 2023-05-27 PROCEDURE — 80143 DRUG ASSAY ACETAMINOPHEN: CPT

## 2023-05-27 PROCEDURE — 86140 C-REACTIVE PROTEIN: CPT

## 2023-05-27 PROCEDURE — 80053 COMPREHEN METABOLIC PANEL: CPT

## 2023-05-27 PROCEDURE — 99285 EMERGENCY DEPT VISIT HI MDM: CPT

## 2023-05-27 RX ORDER — LISINOPRIL 20 MG/1
20 TABLET ORAL DAILY
Status: DISCONTINUED | OUTPATIENT
Start: 2023-05-27 | End: 2023-05-31 | Stop reason: HOSPADM

## 2023-05-27 RX ORDER — ZOLPIDEM TARTRATE 5 MG/1
5 TABLET ORAL NIGHTLY
Status: DISCONTINUED | OUTPATIENT
Start: 2023-05-27 | End: 2023-05-27

## 2023-05-27 RX ORDER — ACETAMINOPHEN 650 MG/1
650 SUPPOSITORY RECTAL EVERY 6 HOURS PRN
Status: DISCONTINUED | OUTPATIENT
Start: 2023-05-27 | End: 2023-05-31 | Stop reason: HOSPADM

## 2023-05-27 RX ORDER — ENOXAPARIN SODIUM 100 MG/ML
30 INJECTION SUBCUTANEOUS 2 TIMES DAILY
Status: DISCONTINUED | OUTPATIENT
Start: 2023-05-27 | End: 2023-05-31 | Stop reason: HOSPADM

## 2023-05-27 RX ORDER — LEVOFLOXACIN 5 MG/ML
750 INJECTION, SOLUTION INTRAVENOUS EVERY 24 HOURS
Status: CANCELLED | OUTPATIENT
Start: 2023-05-27

## 2023-05-27 RX ORDER — SODIUM CHLORIDE 0.9 % (FLUSH) 0.9 %
5-40 SYRINGE (ML) INJECTION PRN
Status: DISCONTINUED | OUTPATIENT
Start: 2023-05-27 | End: 2023-05-31 | Stop reason: HOSPADM

## 2023-05-27 RX ORDER — ONDANSETRON 4 MG/1
4 TABLET, ORALLY DISINTEGRATING ORAL EVERY 8 HOURS PRN
Status: DISCONTINUED | OUTPATIENT
Start: 2023-05-27 | End: 2023-05-31 | Stop reason: HOSPADM

## 2023-05-27 RX ORDER — LEVOTHYROXINE SODIUM 0.05 MG/1
100 TABLET ORAL DAILY
Status: DISCONTINUED | OUTPATIENT
Start: 2023-05-27 | End: 2023-05-31 | Stop reason: HOSPADM

## 2023-05-27 RX ORDER — POLYETHYLENE GLYCOL 3350 17 G/17G
17 POWDER, FOR SOLUTION ORAL DAILY PRN
Status: DISCONTINUED | OUTPATIENT
Start: 2023-05-27 | End: 2023-05-31 | Stop reason: HOSPADM

## 2023-05-27 RX ORDER — 0.9 % SODIUM CHLORIDE 0.9 %
1000 INTRAVENOUS SOLUTION INTRAVENOUS ONCE
Status: COMPLETED | OUTPATIENT
Start: 2023-05-27 | End: 2023-05-28

## 2023-05-27 RX ORDER — POTASSIUM CHLORIDE 750 MG/1
40 TABLET, FILM COATED, EXTENDED RELEASE ORAL
Status: COMPLETED | OUTPATIENT
Start: 2023-05-27 | End: 2023-05-27

## 2023-05-27 RX ORDER — ACETAMINOPHEN 325 MG/1
650 TABLET ORAL EVERY 6 HOURS PRN
Status: DISCONTINUED | OUTPATIENT
Start: 2023-05-27 | End: 2023-05-31 | Stop reason: HOSPADM

## 2023-05-27 RX ORDER — ONDANSETRON 2 MG/ML
4 INJECTION INTRAMUSCULAR; INTRAVENOUS EVERY 6 HOURS PRN
Status: DISCONTINUED | OUTPATIENT
Start: 2023-05-27 | End: 2023-05-31 | Stop reason: HOSPADM

## 2023-05-27 RX ORDER — SODIUM CHLORIDE 0.9 % (FLUSH) 0.9 %
5-40 SYRINGE (ML) INJECTION EVERY 12 HOURS SCHEDULED
Status: DISCONTINUED | OUTPATIENT
Start: 2023-05-27 | End: 2023-05-31 | Stop reason: HOSPADM

## 2023-05-27 RX ORDER — SODIUM CHLORIDE 9 MG/ML
INJECTION, SOLUTION INTRAVENOUS PRN
Status: DISCONTINUED | OUTPATIENT
Start: 2023-05-27 | End: 2023-05-31 | Stop reason: HOSPADM

## 2023-05-27 RX ADMIN — WATER 1000 MG: 1 INJECTION INTRAMUSCULAR; INTRAVENOUS; SUBCUTANEOUS at 23:42

## 2023-05-27 RX ADMIN — POTASSIUM CHLORIDE 40 MEQ: 750 TABLET, FILM COATED, EXTENDED RELEASE ORAL at 19:11

## 2023-05-27 RX ADMIN — SODIUM CHLORIDE 1000 ML: 9 INJECTION, SOLUTION INTRAVENOUS at 19:12

## 2023-05-27 ASSESSMENT — ENCOUNTER SYMPTOMS
ABDOMINAL PAIN: 0
SORE THROAT: 0
BACK PAIN: 0
COUGH: 0

## 2023-05-27 ASSESSMENT — PAIN - FUNCTIONAL ASSESSMENT: PAIN_FUNCTIONAL_ASSESSMENT: NONE - DENIES PAIN

## 2023-05-28 LAB
25(OH)D3 SERPL-MCNC: 12 NG/ML (ref 30–100)
ALBUMIN SERPL-MCNC: 3.1 G/DL (ref 3.5–5)
ALBUMIN/GLOB SERPL: 0.9 (ref 1.1–2.2)
ALP SERPL-CCNC: 57 U/L (ref 45–117)
ALT SERPL-CCNC: 48 U/L (ref 12–78)
ANION GAP SERPL CALC-SCNC: 7 MMOL/L (ref 5–15)
APTT PPP: 22.8 SEC (ref 22.1–31)
AST SERPL-CCNC: 53 U/L (ref 15–37)
BILIRUB SERPL-MCNC: 0.3 MG/DL (ref 0.2–1)
BUN SERPL-MCNC: 14 MG/DL (ref 6–20)
BUN/CREAT SERPL: 13 (ref 12–20)
CALCIUM SERPL-MCNC: 8.4 MG/DL (ref 8.5–10.1)
CHLORIDE SERPL-SCNC: 112 MMOL/L (ref 97–108)
CO2 SERPL-SCNC: 24 MMOL/L (ref 21–32)
COMMENT:: NORMAL
COMMENT:: NORMAL
CREAT SERPL-MCNC: 1.06 MG/DL (ref 0.55–1.02)
EKG ATRIAL RATE: 74 BPM
EKG DIAGNOSIS: NORMAL
EKG P AXIS: 59 DEGREES
EKG P-R INTERVAL: 220 MS
EKG Q-T INTERVAL: 466 MS
EKG QRS DURATION: 130 MS
EKG QTC CALCULATION (BAZETT): 517 MS
EKG R AXIS: -60 DEGREES
EKG T AXIS: -56 DEGREES
EKG VENTRICULAR RATE: 74 BPM
GLOBULIN SER CALC-MCNC: 3.3 G/DL (ref 2–4)
GLUCOSE SERPL-MCNC: 92 MG/DL (ref 65–100)
INR PPP: 1 (ref 0.9–1.1)
POTASSIUM SERPL-SCNC: 3.6 MMOL/L (ref 3.5–5.1)
PROT SERPL-MCNC: 6.4 G/DL (ref 6.4–8.2)
PROTHROMBIN TIME: 10.1 SEC (ref 9–11.1)
SODIUM SERPL-SCNC: 143 MMOL/L (ref 136–145)
SPECIMEN HOLD: NORMAL
SPECIMEN HOLD: NORMAL
THERAPEUTIC RANGE: NORMAL SECS (ref 58–77)

## 2023-05-28 PROCEDURE — 2580000003 HC RX 258: Performed by: FAMILY MEDICINE

## 2023-05-28 PROCEDURE — 82306 VITAMIN D 25 HYDROXY: CPT

## 2023-05-28 PROCEDURE — 85610 PROTHROMBIN TIME: CPT

## 2023-05-28 PROCEDURE — 6370000000 HC RX 637 (ALT 250 FOR IP): Performed by: FAMILY MEDICINE

## 2023-05-28 PROCEDURE — 6360000002 HC RX W HCPCS: Performed by: FAMILY MEDICINE

## 2023-05-28 PROCEDURE — 93010 ELECTROCARDIOGRAM REPORT: CPT | Performed by: INTERNAL MEDICINE

## 2023-05-28 PROCEDURE — 87449 NOS EACH ORGANISM AG IA: CPT

## 2023-05-28 PROCEDURE — 1100000000 HC RM PRIVATE

## 2023-05-28 PROCEDURE — 80053 COMPREHEN METABOLIC PANEL: CPT

## 2023-05-28 PROCEDURE — 85730 THROMBOPLASTIN TIME PARTIAL: CPT

## 2023-05-28 PROCEDURE — 36415 COLL VENOUS BLD VENIPUNCTURE: CPT

## 2023-05-28 RX ADMIN — ENOXAPARIN SODIUM 30 MG: 100 INJECTION SUBCUTANEOUS at 22:08

## 2023-05-28 RX ADMIN — WATER 1000 MG: 1 INJECTION INTRAMUSCULAR; INTRAVENOUS; SUBCUTANEOUS at 22:09

## 2023-05-28 RX ADMIN — LEVOTHYROXINE SODIUM 100 MCG: 0.05 TABLET ORAL at 08:38

## 2023-05-28 RX ADMIN — Medication 10 ML: at 00:06

## 2023-05-28 RX ADMIN — Medication 10 ML: at 22:09

## 2023-05-28 RX ADMIN — ACETAMINOPHEN 650 MG: 325 TABLET, FILM COATED ORAL at 01:11

## 2023-05-28 RX ADMIN — LISINOPRIL 20 MG: 20 TABLET ORAL at 08:38

## 2023-05-28 RX ADMIN — Medication 10 ML: at 08:39

## 2023-05-29 LAB
ALBUMIN SERPL-MCNC: 3.2 G/DL (ref 3.5–5)
ALBUMIN/GLOB SERPL: 0.9 (ref 1.1–2.2)
ALP SERPL-CCNC: 61 U/L (ref 45–117)
ALT SERPL-CCNC: 47 U/L (ref 12–78)
ANION GAP SERPL CALC-SCNC: 8 MMOL/L (ref 5–15)
AST SERPL-CCNC: 38 U/L (ref 15–37)
BILIRUB SERPL-MCNC: 0.3 MG/DL (ref 0.2–1)
BUN SERPL-MCNC: 14 MG/DL (ref 6–20)
BUN/CREAT SERPL: 16 (ref 12–20)
CALCIUM SERPL-MCNC: 8.5 MG/DL (ref 8.5–10.1)
CHLORIDE SERPL-SCNC: 113 MMOL/L (ref 97–108)
CO2 SERPL-SCNC: 21 MMOL/L (ref 21–32)
CREAT SERPL-MCNC: 0.9 MG/DL (ref 0.55–1.02)
ERYTHROCYTE [DISTWIDTH] IN BLOOD BY AUTOMATED COUNT: 14.1 % (ref 11.5–14.5)
GLOBULIN SER CALC-MCNC: 3.5 G/DL (ref 2–4)
GLUCOSE SERPL-MCNC: 95 MG/DL (ref 65–100)
HCT VFR BLD AUTO: 40.5 % (ref 35–47)
HGB BLD-MCNC: 13 G/DL (ref 11.5–16)
MCH RBC QN AUTO: 30.4 PG (ref 26–34)
MCHC RBC AUTO-ENTMCNC: 32.1 G/DL (ref 30–36.5)
MCV RBC AUTO: 94.8 FL (ref 80–99)
NRBC # BLD: 0 K/UL (ref 0–0.01)
NRBC BLD-RTO: 0 PER 100 WBC
PLATELET # BLD AUTO: 282 K/UL (ref 150–400)
PMV BLD AUTO: 10.1 FL (ref 8.9–12.9)
POTASSIUM SERPL-SCNC: 3.4 MMOL/L (ref 3.5–5.1)
PROT SERPL-MCNC: 6.7 G/DL (ref 6.4–8.2)
RBC # BLD AUTO: 4.27 M/UL (ref 3.8–5.2)
SARS-COV-2 RNA RESP QL NAA+PROBE: DETECTED
SODIUM SERPL-SCNC: 142 MMOL/L (ref 136–145)
SOURCE: ABNORMAL
WBC # BLD AUTO: 7.7 K/UL (ref 3.6–11)

## 2023-05-29 PROCEDURE — 2580000003 HC RX 258: Performed by: FAMILY MEDICINE

## 2023-05-29 PROCEDURE — 1100000000 HC RM PRIVATE

## 2023-05-29 PROCEDURE — 36415 COLL VENOUS BLD VENIPUNCTURE: CPT

## 2023-05-29 PROCEDURE — 6370000000 HC RX 637 (ALT 250 FOR IP): Performed by: NURSE PRACTITIONER

## 2023-05-29 PROCEDURE — 85027 COMPLETE CBC AUTOMATED: CPT

## 2023-05-29 PROCEDURE — 87635 SARS-COV-2 COVID-19 AMP PRB: CPT

## 2023-05-29 PROCEDURE — 6370000000 HC RX 637 (ALT 250 FOR IP): Performed by: FAMILY MEDICINE

## 2023-05-29 PROCEDURE — 6360000002 HC RX W HCPCS: Performed by: FAMILY MEDICINE

## 2023-05-29 PROCEDURE — 80053 COMPREHEN METABOLIC PANEL: CPT

## 2023-05-29 PROCEDURE — 6370000000 HC RX 637 (ALT 250 FOR IP): Performed by: HOSPITALIST

## 2023-05-29 RX ORDER — DIVALPROEX SODIUM 250 MG/1
250 TABLET, DELAYED RELEASE ORAL EVERY 8 HOURS SCHEDULED
Status: DISCONTINUED | OUTPATIENT
Start: 2023-05-29 | End: 2023-05-31

## 2023-05-29 RX ORDER — POTASSIUM CHLORIDE 750 MG/1
40 TABLET, FILM COATED, EXTENDED RELEASE ORAL ONCE
Status: COMPLETED | OUTPATIENT
Start: 2023-05-29 | End: 2023-05-29

## 2023-05-29 RX ORDER — OLANZAPINE 2.5 MG/1
2.5 TABLET ORAL NIGHTLY
Status: DISCONTINUED | OUTPATIENT
Start: 2023-05-29 | End: 2023-05-31

## 2023-05-29 RX ADMIN — ENOXAPARIN SODIUM 30 MG: 100 INJECTION SUBCUTANEOUS at 21:17

## 2023-05-29 RX ADMIN — LISINOPRIL 20 MG: 20 TABLET ORAL at 10:31

## 2023-05-29 RX ADMIN — DIVALPROEX SODIUM 250 MG: 250 TABLET, DELAYED RELEASE ORAL at 16:23

## 2023-05-29 RX ADMIN — OLANZAPINE 2.5 MG: 2.5 TABLET, FILM COATED ORAL at 21:21

## 2023-05-29 RX ADMIN — Medication 10 ML: at 21:13

## 2023-05-29 RX ADMIN — POTASSIUM CHLORIDE 40 MEQ: 750 TABLET, FILM COATED, EXTENDED RELEASE ORAL at 10:31

## 2023-05-29 RX ADMIN — LEVOTHYROXINE SODIUM 100 MCG: 0.05 TABLET ORAL at 10:32

## 2023-05-29 RX ADMIN — ENOXAPARIN SODIUM 30 MG: 100 INJECTION SUBCUTANEOUS at 10:32

## 2023-05-29 RX ADMIN — Medication 10 ML: at 10:32

## 2023-05-29 RX ADMIN — ACETAMINOPHEN 650 MG: 325 TABLET, FILM COATED ORAL at 12:01

## 2023-05-29 RX ADMIN — ONDANSETRON 4 MG: 4 TABLET, ORALLY DISINTEGRATING ORAL at 14:11

## 2023-05-29 RX ADMIN — DIVALPROEX SODIUM 250 MG: 250 TABLET, DELAYED RELEASE ORAL at 21:20

## 2023-05-29 RX ADMIN — WATER 1000 MG: 1 INJECTION INTRAMUSCULAR; INTRAVENOUS; SUBCUTANEOUS at 21:13

## 2023-05-29 ASSESSMENT — PAIN SCALES - GENERAL
PAINLEVEL_OUTOF10: 0
PAINLEVEL_OUTOF10: 0

## 2023-05-29 NOTE — CONSULTS
PSYCHIATRY CONSULT NOTE    REASON FOR CONSULT:lauryn      HISTORY OF PRESENTING COMPLAINT:  Hernesto Sierra is a 74 y.o. White (non-) female who is currently admitted to the medical floor at [facility]. Patient presented to the ED with daughter and son in law due to manic behavior. Per chart, this current episode started 2 months ago but had progressively gotten worse. Reportedly, she has been loosing weight, no sleeping and constantly talking. This appears to be her 3rd manic episode per chart. On assessment today, patient is received standing at the door. She is noted with significant manic symptoms which include disorganized thoughts, flights of ideas, loose associations and very tangential. When asked about what brought her to the hospital, she states \"I stupidly ate lobster and cabbage. .I lived in [place]. ..since the beginning of my knowledge 7-8 years ago\". She provides answers unrelated to the questions asked. She is easily redirectable. She reports her is \"ok\" and denies symptoms of depression and anxiety. She denies suicidal/homicidal thoughts and AV hallucinations. She states that she has been sleeping a lot since coming to the hospital and eating all the meals. Patient denies hx of mental ill, substance abuse and recreational drugs. Per  Krishna Altmanbing, patient's symptoms have been manifesting on and off for a several years now. She has never sought treatment and not currently taking any medications per spouse. She was taking Ambien for sleep but stopped 2 weeks ago.  reports patient has never mentioned that she wants to hurt herself or others. He reports patient smokes about 3 cigarettes/day, she does not uses nay other substance. PAST PSYCHIATRIC HISTORY and SUBSTANCE ABUSE HISTORY:  [facility]    PAST MEDICAL HISTORY:    Please see H&P for details.      Past Medical History:   Diagnosis Date    Arthritis     Headache     Ill-defined condition     gout    Joint pain      Prior to

## 2023-05-29 NOTE — PLAN OF CARE
Problem: ABCDS Injury Assessment  Goal: Absence of physical injury  5/28/2023 2340 by Maged Sanders LPN  Outcome: Progressing  5/28/2023 1317 by Keila Alonso RN  Outcome: Progressing     Problem: Safety - Adult  Goal: Free from fall injury  5/28/2023 2340 by Maged Sanders LPN  Outcome: Progressing  5/28/2023 1317 by Keila Alonso RN  Outcome: Progressing

## 2023-05-29 NOTE — BSMART NOTE
BSMART Liaison Team Note     LOS:  2 Days     Patient goal(s) for today: Take medications as prescribed, communicate needs to staff in an appropriate manner  BSMART Liaison team focus/goals: Assess needs, provide education and support    Progress note: Liaison and Jared Flynn met w/ pt face to face. Pt was received standing in the entrance of her room. When asked how pt is feeling today she responds, \"Well I'm a graduate of [email or web address]. Odalis Males Odalis Males I would like a Midol because I have really bad allergies\". Pt presents as disheveled. Attitude is mildly argumentative, but cooperative. Motor activity is highly restless. Pt would walk around the room and touch items on the wall frequently. Eye-contact is direct. Affect and mood are labile. Thought process is tangential and disorganized w/ flight of ideas and loose associations. Pt requires frequent redirection. Pt is oriented to self, location and date, but not situation. Memory and concentration is not intact. Insight and judgment are poor. Pt denies SI/HI/VH/AH stating to each question, \"Absolutely not\". When asked why pt was admitted to the hospital she responds, \"I stupidly eat lobster and cabbage. .. I lived here in [place]. .. since the beginning of my knowledge 11-9 years old. .. my seasonal allergies were not affected in [place]. .. I did not have my food regime of lining my gut. .. I can see every blade of grass\". At one point during this meeting, pt requested for liaison to close the door stating, \"Am I concerned you are using this hallway for cancer patients\". Pt reports eating all meals throughout the day and comments that she has been \"sleeping a lot\" since coming to the hospital. Pt denies any history of mental health diagnoses. Recommendation is for 1:1 sitter d/t pt presenting w/ symptoms of lauryn, disorientation to situation and requiring frequent redirection. Jared Flynn is in agreement w/ recommendation.   Pt

## 2023-05-30 LAB
ALBUMIN SERPL-MCNC: 3.3 G/DL (ref 3.5–5)
ALBUMIN/GLOB SERPL: 1.1 (ref 1.1–2.2)
ALP SERPL-CCNC: 62 U/L (ref 45–117)
ALT SERPL-CCNC: 49 U/L (ref 12–78)
ANION GAP SERPL CALC-SCNC: 7 MMOL/L (ref 5–15)
AST SERPL-CCNC: 44 U/L (ref 15–37)
BACTERIA SPEC CULT: ABNORMAL
BACTERIA SPEC CULT: ABNORMAL
BILIRUB SERPL-MCNC: 0.3 MG/DL (ref 0.2–1)
BUN SERPL-MCNC: 14 MG/DL (ref 6–20)
BUN/CREAT SERPL: 14 (ref 12–20)
CALCIUM SERPL-MCNC: 8.9 MG/DL (ref 8.5–10.1)
CC UR VC: ABNORMAL
CHLORIDE SERPL-SCNC: 113 MMOL/L (ref 97–108)
CO2 SERPL-SCNC: 24 MMOL/L (ref 21–32)
CREAT SERPL-MCNC: 1.01 MG/DL (ref 0.55–1.02)
ERYTHROCYTE [DISTWIDTH] IN BLOOD BY AUTOMATED COUNT: 14.1 % (ref 11.5–14.5)
GLOBULIN SER CALC-MCNC: 3.1 G/DL (ref 2–4)
GLUCOSE SERPL-MCNC: 96 MG/DL (ref 65–100)
HCT VFR BLD AUTO: 38 % (ref 35–47)
HGB BLD-MCNC: 12.4 G/DL (ref 11.5–16)
L PNEUMO1 AG UR QL IA: NEGATIVE
MCH RBC QN AUTO: 30.6 PG (ref 26–34)
MCHC RBC AUTO-ENTMCNC: 32.6 G/DL (ref 30–36.5)
MCV RBC AUTO: 93.8 FL (ref 80–99)
NRBC # BLD: 0 K/UL (ref 0–0.01)
NRBC BLD-RTO: 0 PER 100 WBC
PLATELET # BLD AUTO: 300 K/UL (ref 150–400)
PMV BLD AUTO: 10 FL (ref 8.9–12.9)
POTASSIUM SERPL-SCNC: 4.4 MMOL/L (ref 3.5–5.1)
PROT SERPL-MCNC: 6.4 G/DL (ref 6.4–8.2)
RBC # BLD AUTO: 4.05 M/UL (ref 3.8–5.2)
SERVICE CMNT-IMP: ABNORMAL
SODIUM SERPL-SCNC: 144 MMOL/L (ref 136–145)
SPECIMEN SOURCE: NORMAL
WBC # BLD AUTO: 6.8 K/UL (ref 3.6–11)

## 2023-05-30 PROCEDURE — 6370000000 HC RX 637 (ALT 250 FOR IP): Performed by: FAMILY MEDICINE

## 2023-05-30 PROCEDURE — 36415 COLL VENOUS BLD VENIPUNCTURE: CPT

## 2023-05-30 PROCEDURE — 2580000003 HC RX 258: Performed by: FAMILY MEDICINE

## 2023-05-30 PROCEDURE — 6360000002 HC RX W HCPCS: Performed by: FAMILY MEDICINE

## 2023-05-30 PROCEDURE — 1100000000 HC RM PRIVATE

## 2023-05-30 PROCEDURE — 85027 COMPLETE CBC AUTOMATED: CPT

## 2023-05-30 PROCEDURE — 80053 COMPREHEN METABOLIC PANEL: CPT

## 2023-05-30 PROCEDURE — 6370000000 HC RX 637 (ALT 250 FOR IP): Performed by: NURSE PRACTITIONER

## 2023-05-30 RX ADMIN — DIVALPROEX SODIUM 250 MG: 250 TABLET, DELAYED RELEASE ORAL at 05:49

## 2023-05-30 RX ADMIN — DIVALPROEX SODIUM 250 MG: 250 TABLET, DELAYED RELEASE ORAL at 22:51

## 2023-05-30 RX ADMIN — OLANZAPINE 2.5 MG: 2.5 TABLET, FILM COATED ORAL at 22:51

## 2023-05-30 RX ADMIN — WATER 1000 MG: 1 INJECTION INTRAMUSCULAR; INTRAVENOUS; SUBCUTANEOUS at 22:52

## 2023-05-30 RX ADMIN — LISINOPRIL 20 MG: 20 TABLET ORAL at 09:06

## 2023-05-30 RX ADMIN — DIVALPROEX SODIUM 250 MG: 250 TABLET, DELAYED RELEASE ORAL at 15:25

## 2023-05-30 RX ADMIN — Medication 10 ML: at 08:30

## 2023-05-30 RX ADMIN — Medication 10 ML: at 22:52

## 2023-05-30 RX ADMIN — LEVOTHYROXINE SODIUM 100 MCG: 0.05 TABLET ORAL at 09:06

## 2023-05-30 RX ADMIN — ENOXAPARIN SODIUM 30 MG: 100 INJECTION SUBCUTANEOUS at 22:51

## 2023-05-30 ASSESSMENT — PAIN SCALES - GENERAL
PAINLEVEL_OUTOF10: 0
PAINLEVEL_OUTOF10: 0

## 2023-05-30 NOTE — BSMART NOTE
BSMART Liaison Team Note     LOS:  3 days     Patient goal(s) for today: Take medications as prescribed, communicate needs to staff in an appropriate manner  BSMART Liaison team focus/goals: Assess needs, provide education and support    Progress note: Liaison and NP Roverto met face to face with pt in her room on the medical floor. Pt presents sitting up in recliner, mixing her breakfast together, including eggs, rice krispies, soy milk, and orange juice. Pt tells liaison and NP that years ago, she would use a raw egg. When asked how she is feeling today, pt responds, \"Terrible because the medications are making my eyes water. \" Pt appears alert, manic, easily irritated, slightly labile, but overall calm, cooperative, and able to be redirected with persistent prompting. Pt's thought process continues to appear disorganized and tangential, with evidence of flight of ideas and loose associations. Pt jumps from topic to topic throughout the session with her, and she often gives irrelevant responses to questions posed to her. She can provide limited responses with prompting. Pt reiterates that she is from the [facility]. \" She rambles that the building where we are standing was a \"[employer]. Pt says that she is currently at [address] [place] [address], which is her correct home address. Pt looks at the board on the wall to give the correct date. Pt says out of context to questions asked that \"my sarah daughter is a pulse bar. \" When asked if she has been having any hallucinations, pt answers, \"The only hallucinations I've had is that right now my head is killing me, like I've been hit on the head with a suitcase. \" Pt says, \"I got a lot of sleep. \" She describes her appetite as \"fantastic. \" Pt denies any SI/HI/AH/VH when specifically asked. Liaison will continue to monitor and to support. Barriers to Discharge: COVID+     Outpatient provider(s):  [facility].  Per NIKOLAI Layne

## 2023-05-30 NOTE — CONSULTS
PSYCHIATRY CONSULT NOTE    REASON FOR CONSULT:lauryn    5/30/23: Ms Alba Zarate remains the same, still very manic with flights of ideas and looses associations. Her responses to question does not relate to the question asked. She mixed all her breakfast in her drinking cup which include the eggs, cereal, orange juice and milk stating that she is making \"protein platter\". She is fixated on the medications that are given to her and states it makes her pupil dilate. She denies suicidal/homicidal thoughts and AV hallucinations. She reports she has been sleeping and eating. HISTORY OF PRESENTING COMPLAINT:  Lakhwinder Tang is a 74 y.o. White (non-) female who is currently admitted to the medical floor at [facility]. Patient presented to the ED with daughter and son in law due to manic behavior. Per chart, this current episode started 2 months ago but had progressively gotten worse. Reportedly, she has been loosing weight, no sleeping and constantly talking. This appears to be her 3rd manic episode per chart. On assessment today, patient is received standing at the door. She is noted with significant manic symptoms which include disorganized thoughts, flights of ideas, loose associations and very tangential. When asked about what brought her to the hospital, she states \"I stupidly ate lobster and cabbage. .I lived in [place]. ..since the beginning of my knowledge 7-8 years ago\". She provides answers unrelated to the questions asked. She is easily redirectable. She reports her is \"ok\" and denies symptoms of depression and anxiety. She denies suicidal/homicidal thoughts and AV hallucinations. She states that she has been sleeping a lot since coming to the hospital and eating all the meals. Patient denies hx of mental ill, substance abuse and recreational drugs. Per  Louisa Douglas, patient's symptoms have been manifesting on and off for a several years now.  She has never sought treatment and not currently

## 2023-05-30 NOTE — CARE COORDINATION
4:02 PM    CM attempted to speak with patient via telephone to complete initial assessment no one answered. Call placed to patient's spouse, Sonia Gaona [phone] in efforts to complete assessment. No one present. CM left a message requesting a return call. It appears CATHRYN plan at this time is for patient to transition to inpatient psych after COVID clearance. Case escalated to [facility] Supervisor as mentioned in rounds for a potential transfer to [facility] for continued care until cleared from [facility]. CM will continue to follow and assist with CATHRYN needs as they arise.     JOSÉ ANTONIO Alvares/GERONIMO

## 2023-05-31 VITALS
HEIGHT: 64 IN | SYSTOLIC BLOOD PRESSURE: 118 MMHG | TEMPERATURE: 97.7 F | DIASTOLIC BLOOD PRESSURE: 87 MMHG | OXYGEN SATURATION: 99 % | RESPIRATION RATE: 18 BRPM | BODY MASS INDEX: 28.68 KG/M2 | HEART RATE: 76 BPM | WEIGHT: 167.99 LBS

## 2023-05-31 LAB
ALBUMIN SERPL-MCNC: 3 G/DL (ref 3.5–5)
ALBUMIN/GLOB SERPL: 0.8 (ref 1.1–2.2)
ALP SERPL-CCNC: 57 U/L (ref 45–117)
ALT SERPL-CCNC: 52 U/L (ref 12–78)
ANION GAP SERPL CALC-SCNC: 7 MMOL/L (ref 5–15)
AST SERPL-CCNC: ABNORMAL U/L (ref 15–37)
BILIRUB SERPL-MCNC: 0.4 MG/DL (ref 0.2–1)
BUN SERPL-MCNC: 17 MG/DL (ref 6–20)
BUN/CREAT SERPL: 18 (ref 12–20)
CALCIUM SERPL-MCNC: 8.8 MG/DL (ref 8.5–10.1)
CHLORIDE SERPL-SCNC: 112 MMOL/L (ref 97–108)
CO2 SERPL-SCNC: 22 MMOL/L (ref 21–32)
CREAT SERPL-MCNC: 0.93 MG/DL (ref 0.55–1.02)
ERYTHROCYTE [DISTWIDTH] IN BLOOD BY AUTOMATED COUNT: 14.2 % (ref 11.5–14.5)
GLOBULIN SER CALC-MCNC: 3.9 G/DL (ref 2–4)
GLUCOSE SERPL-MCNC: 128 MG/DL (ref 65–100)
HCT VFR BLD AUTO: 38.6 % (ref 35–47)
HGB BLD-MCNC: 12.3 G/DL (ref 11.5–16)
MCH RBC QN AUTO: 30.7 PG (ref 26–34)
MCHC RBC AUTO-ENTMCNC: 31.9 G/DL (ref 30–36.5)
MCV RBC AUTO: 96.3 FL (ref 80–99)
NRBC # BLD: 0 K/UL (ref 0–0.01)
NRBC BLD-RTO: 0 PER 100 WBC
PLATELET # BLD AUTO: 232 K/UL (ref 150–400)
PMV BLD AUTO: 10.8 FL (ref 8.9–12.9)
POTASSIUM SERPL-SCNC: ABNORMAL MMOL/L (ref 3.5–5.1)
PROT SERPL-MCNC: 6.9 G/DL (ref 6.4–8.2)
RBC # BLD AUTO: 4.01 M/UL (ref 3.8–5.2)
SODIUM SERPL-SCNC: 141 MMOL/L (ref 136–145)
WBC # BLD AUTO: 6.4 K/UL (ref 3.6–11)

## 2023-05-31 PROCEDURE — 80053 COMPREHEN METABOLIC PANEL: CPT

## 2023-05-31 PROCEDURE — 6370000000 HC RX 637 (ALT 250 FOR IP): Performed by: HOSPITALIST

## 2023-05-31 PROCEDURE — 6360000002 HC RX W HCPCS: Performed by: FAMILY MEDICINE

## 2023-05-31 PROCEDURE — 85027 COMPLETE CBC AUTOMATED: CPT

## 2023-05-31 PROCEDURE — 36415 COLL VENOUS BLD VENIPUNCTURE: CPT

## 2023-05-31 PROCEDURE — 2580000003 HC RX 258: Performed by: FAMILY MEDICINE

## 2023-05-31 PROCEDURE — 6370000000 HC RX 637 (ALT 250 FOR IP): Performed by: FAMILY MEDICINE

## 2023-05-31 RX ORDER — DIVALPROEX SODIUM 250 MG/1
500 TABLET, DELAYED RELEASE ORAL 2 TIMES DAILY
Status: DISCONTINUED | OUTPATIENT
Start: 2023-05-31 | End: 2023-05-31 | Stop reason: HOSPADM

## 2023-05-31 RX ORDER — CEPHALEXIN 500 MG/1
500 CAPSULE ORAL 4 TIMES DAILY
Qty: 11 CAPSULE | Refills: 0 | Status: SHIPPED | OUTPATIENT
Start: 2023-05-31 | End: 2023-06-03

## 2023-05-31 RX ORDER — CEPHALEXIN 500 MG/1
500 CAPSULE ORAL EVERY 6 HOURS SCHEDULED
Status: DISCONTINUED | OUTPATIENT
Start: 2023-05-31 | End: 2023-05-31 | Stop reason: HOSPADM

## 2023-05-31 RX ORDER — OLANZAPINE 5 MG/1
5 TABLET ORAL NIGHTLY
Status: DISCONTINUED | OUTPATIENT
Start: 2023-05-31 | End: 2023-05-31 | Stop reason: HOSPADM

## 2023-05-31 RX ORDER — LANOLIN ALCOHOL/MO/W.PET/CERES
6 CREAM (GRAM) TOPICAL NIGHTLY PRN
Status: DISCONTINUED | OUTPATIENT
Start: 2023-05-31 | End: 2023-05-31 | Stop reason: HOSPADM

## 2023-05-31 RX ORDER — TRAZODONE HYDROCHLORIDE 100 MG/1
100 TABLET ORAL NIGHTLY
Status: DISCONTINUED | OUTPATIENT
Start: 2023-05-31 | End: 2023-05-31 | Stop reason: HOSPADM

## 2023-05-31 RX ADMIN — ENOXAPARIN SODIUM 30 MG: 100 INJECTION SUBCUTANEOUS at 10:17

## 2023-05-31 RX ADMIN — CEPHALEXIN 500 MG: 500 CAPSULE ORAL at 19:13

## 2023-05-31 RX ADMIN — LEVOTHYROXINE SODIUM 100 MCG: 0.05 TABLET ORAL at 10:18

## 2023-05-31 RX ADMIN — LISINOPRIL 20 MG: 20 TABLET ORAL at 10:18

## 2023-05-31 RX ADMIN — Medication 10 ML: at 10:19

## 2023-05-31 RX ADMIN — ACETAMINOPHEN 650 MG: 325 TABLET, FILM COATED ORAL at 04:19

## 2023-05-31 ASSESSMENT — PAIN SCALES - GENERAL
PAINLEVEL_OUTOF10: 0
PAINLEVEL_OUTOF10: 0

## 2023-05-31 ASSESSMENT — PAIN DESCRIPTION - LOCATION: LOCATION: HEAD

## 2023-05-31 NOTE — PLAN OF CARE
Problem: Safety - Adult  Goal: Free from fall injury  Outcome: Progressing     Problem: Pain  Goal: Verbalizes/displays adequate comfort level or baseline comfort level  Outcome: Progressing     Bedside report given to Mackenzie GRIJALVA

## 2023-05-31 NOTE — CONSULTS
PSYCHIATRY CONSULT PROGRESS NOTE    REASON FOR CONSULT:lauryn    05/31/2023  I conferred with patient's primary team, who informed me that patient has refused her prescribed depakote. I evaluated the patient and spoke with her , who was present, and her daughter, Simba Navarro, over the phone. Patient states that she's only slept 2 hours and explains to me that she refused her depakote because it is affecting her pupillary reaction. (This is not a known side-effect of depakote. ). Her presentation was evident for loose associations and tangentiality. Her explanations were illogical. She did express that she is not comfortable here and would like to go home. Her  is supportive, but also requested for the patient's return home, explaining that the hospital environment is not conducive to rest or sleep. There is no evidence for suicidality or psychosis. 5/30/23: Ms Gris Alvares remains the same, still very manic with flights of ideas and looses associations. Her responses to question does not relate to the question asked. She mixed all her breakfast in her drinking cup which include the eggs, cereal, orange juice and milk stating that she is making \"protein platter\". She is fixated on the medications that are given to her and states it makes her pupil dilate. She denies suicidal/homicidal thoughts and AV hallucinations. She reports she has been sleeping and eating. HISTORY OF PRESENTING COMPLAINT:  Ree Anderson is a 74 y.o. White (non-) female who is currently admitted to the medical floor at [facility]. Patient presented to the ED with daughter and son in law due to manic behavior. Per chart, this current episode started 2 months ago but had progressively gotten worse. Reportedly, she has been loosing weight, no sleeping and constantly talking. This appears to be her 3rd manic episode per chart. On assessment today, patient is received standing at the door.  She is noted with significant

## 2023-05-31 NOTE — CARE COORDINATION
Care Management Initial Assessment       RUR: 8%  *Readmission? No  1st IM letter given? Yes - 5/30/23  1st  letter given:  N/A         05/31/23 1346   Service Assessment   Patient Orientation Alert and Oriented   Cognition Alert   History Provided By Spouse   Primary Caregiver Family   Accompanied By/Relationship Arnav Connellyrew/spouse   Support Systems Spouse/Significant Other;Children   PCP Verified by CM Yes   Last Visit to PCP Within last 3 months   Prior Functional Level Independent in ADLs/IADLs   Current Functional Level Independent in ADLs/IADLs   Can patient return to prior living arrangement Yes   Ability to make needs known: Fair   Family able to assist with home care needs: Yes   Would you like for me to discuss the discharge plan with any other family members/significant others, and if so, who? No   Financial Resources None   [employer] None     Transition of Care Plan:    RUR: 8%   Prior Level of Functioning:independent   Disposition:to inpatient psych versus home with family assistance. DME needed:None   Transportation at discharge: TBD. BLS versus family. IM/IMM Medicare/ letter given: Yes  Caregiver Contact: yes  Discharge Caregiver contacted prior to discharge? Care Conference needed? Barriers to discharge: Covid positive. Patient's primary family contact:  Ashleigh Jeter    1st  Medicare IM Letter: 5/30/23  2nd Medicare IM Letter:      Reason for Admission: Roxane with possible psychosis       Hypothyroidism       Covid 19 positive                          Plan for utilizing home health: None         PCP: First and Last name:  Catina Lin MD   Name of Practice:    Are you a current patient: Yes    Approximate date of last visit: about 3 weeks ago                    Current Advanced Directive/Advance Care Plan: Full Code                   CM did not meet with patient due to Covid isolation. CM met with patient's .  Patient lives with her

## 2023-05-31 NOTE — CARE COORDINATION
Care Management - Received call from patient's RN regarding process for TDO. Patient and  would like for patient to leave. Psychiatrist said to call [facility] if patient tried to leave. RN has already called [employer] and they are asking for an e-mail to send the link for the zoom meeting with patient. CM was able to verify the process. RN will call this CM back with any additional questions.   JOSÉ ANTONIO Rose

## 2023-05-31 NOTE — BSMART NOTE
BSMART Liaison Team Note     LOS:  4 days    Patient goal(s) for today: Take medications as prescribed, communicate needs to staff in an appropriate manner  BSMART Liaison team focus/goals: Assess needs, provide education and support, coordinate care    Progress note: Liaison met with pt and her  in her room on the medical floor of [facility] [place]. Pt standing, with her  at her side. Pt appears alert, hypomanic, talkative, friendly, with evidence of flight of ideas and loose associations. Pt continues to ramble, and she is difficult to redirect. Pt tells stories in a circumstantial fashion.  reports that pt only slept about 2 and 1/2 hours last night. No appetite disturbance reported. Pt reports feeling okay b/c of her 's presence. Pt denies SI/HI/VH/AH at this time.  says he will wait to speak with physician for discussion of pt's disposition and recommendations. Liaison will continue to monitor and to support. Barriers to Discharge: COVID+  Guns in the home:      Outpatient provider(s):  [facility]. Per NP Mbony consult note today with : \"Pt's symptoms have been manifesting on and off for a several years now. She has never sought treatment and not currently taking any medications per spouse. She was taking Ambien for sleep but stopped 2 weeks ago.  reports patient has never mentioned that she wants to hurt herself or others. \"  Insurance info/prescription coverage:  Miriam FAJARDO    Diagnosis: (Updated per psychiatric note) Bipolar Disorder, mre manic without psychotic features     Plan:  Please defer to psychiatric consult note for disposition and recommendations. Follow up Psych Consult placed? No.   Psychiatrist updated?   No      Participating treatment team members: Sameera Lorado [place]

## 2023-06-01 LAB
BACTERIA SPEC CULT: NORMAL
BACTERIA SPEC CULT: NORMAL
SERVICE CMNT-IMP: NORMAL
SERVICE CMNT-IMP: NORMAL

## 2023-06-05 NOTE — PROGRESS NOTES
1400- Patient and  educated on purpose, uses and side effects of depakote.  supportive. Opportunity for questions and clarification provided. Patient needs reinforcement.      1- SBAR report given to oncmary RN, Gladys Marshall RN
36: [facility]  interviewed the patient and her  via [place]. The [facility] representative cleared the patient for discharged and a TDO will not be issued at this time. Notified nursing supervisor and Nightime NP of the situation.     Patient doesn't want to stay until morning to be discharged will be leaving AMA
[facility] Adult  Hospitalist Group                                                                                          Hospitalist Progress Note  Aditya Pink MD  Answering service: [phone] from in house phone        Date of Service:  2023  NAME:  Shelly Mcmullen  :  1950  MRN:  [id number]       Admission Summary:     Shelly Mcmullen is a 74 y.o. female PMH of hypothyroidism, HTN, arthritis, gout, traumatic brain injury, degenerative joint disease, s/p hip surgery presented to the ER initially accompanied by her daughter son-in-law for manic behavior and multiple other complaints. At current, history is difficult to obtain due to patient's lauryn with pressured and at times tangential speech. Patient is very talkative and requires redirection during evaluation. Additional history was obtained per review of ED electronic medical records. Per reports, patient reports had manic behavior, approximate 50 pound weight loss (from 190 pounds 2022 to current), not sleeping, constant talking about the past.  Per triage note, this is patient's third manic episode (with prior episodes in , , and  with each episode lasting approximately 6 months duration). Patient reports recurrent episodes starting approximately 2 months ago and worsened in the past week. Patient reportedly was very talkative with up-and-down mood according to triage note. ER reports also the patient was not thought to be taken medication-levothyroxine as directed. Later per discussion with patient, she states that she has been eating \"cabbage\" and seafood despite knowing had allergy. She notes as result she has been having nonbloody diarrhea proximately 3-5 episodes per day. She also admits to having strong smelling patient urine with occasional burning.       On arrival to ED, initial recorded vital signs temperature 98.2 °F, /84, heart rate 80, respiratory rate 18,
[facility] Adult  Hospitalist Group                                                                                          Hospitalist Progress Note  Jessenia Bejarano MD  Answering service: [phone] from in house phone        Date of Service:  2023  NAME:  Aquilino Dinero  :  1950  MRN:  [id number]       Admission Summary:     Aquilino Dinero is a 74 y.o. female PMH of hypothyroidism, HTN, arthritis, gout, traumatic brain injury, degenerative joint disease, s/p hip surgery presented to the ER initially accompanied by her daughter son-in-law for manic behavior and multiple other complaints. At current, history is difficult to obtain due to patient's lauryn with pressured and at times tangential speech. Patient is very talkative and requires redirection during evaluation. Additional history was obtained per review of ED electronic medical records. Per reports, patient reports had manic behavior, approximate 50 pound weight loss (from 190 pounds 2022 to current), not sleeping, constant talking about the past.  Per triage note, this is patient's third manic episode (with prior episodes in , , and  with each episode lasting approximately 6 months duration). Patient reports recurrent episodes starting approximately 2 months ago and worsened in the past week. Patient reportedly was very talkative with up-and-down mood according to triage note. ER reports also the patient was not thought to be taken medication-levothyroxine as directed. Later per discussion with patient, she states that she has been eating \"cabbage\" and seafood despite knowing had allergy. She notes as result she has been having nonbloody diarrhea proximately 3-5 episodes per day. She also admits to having strong smelling patient urine with occasional burning.       On arrival to ED, initial recorded vital signs temperature 98.2 °F, /84, heart rate 80, respiratory rate 18,
AMA paper signed with Steve Cohen NP. IV removed. Pt and  left room ambulating.
Bedside shift change report given to Faraz Espinal RN (oncoming nurse) by [facility], RN (offgoing nurse). Report included the following information Nurse Handoff Report, Index, ED Encounter Summary, ED SBAR, Adult Overview, Surgery Report, Intake/Output, MAR, Recent Results, and Med Rec Status.
Followed up with Darek Esquivel on 5/31/2023 at 2040. Patient left [facility] with a disposition of AMA on 5/31/2023. Patient cited personal obligations and improved/resolved symptoms as reason. Advised patient to follow up with a primary care physician or return to the Emergency Department if symptoms worsen. Informed patient of risks of leaving, including death, patient is a/0 x4 and her and her  verbalize understanding. Antibiotic sent to requested pharmacy.   MARC No NP
I was called to see the patient and talk to the  who wants to leave the hospital today. I had a long discussion with the patient and  at bedside, explained to them that per psychiatrist evaluation and recommendation patient need to be admitted to psych unit to stabilize her condition. They both said they will leave if she can't be discharged. I told him she can't leave AMA.    I discussed with the nurse and  to involve [facility] Crisis team.   Dr lAfredo Adjutant
Physician Progress Note      Matty Mustafa  CSN #:                  [id number]  :                       1950  ADMIT DATE:       2023 3:15 PM  [address] [place] DATE:        2023 9:10 PM  RESPONDING  PROVIDER #:        Ether Marielle MD          QUERY TEXT:    Patient admitted with UTI and Manic episode. Patient noted to have positive   Covid test on May 27. Documentation in the History and Physical of positive   Covid test was incidentally noted without initial stated respiratory   complaints. If possible, please document in progress notes and discharge   summary if patient has an active Covid-19 infection or if patient is testing   positive for Covid-19 without a current active infection: The medical record reflects the following:  Risk Factors: elderly    Clinical Indicators:   98.2, 80, 18, 179/84, O2 Sat 99% on room air (stayed % during   admission)  WBC 6.8  CRP 0.29  Lactic Acid 1.1  Covid+  CXR Neg    ER: to the ED with concerns for manic behavior. Negative for cough/ sore   throat. COVID screen positive    H&P- COVID-19 virus infection. Has cough but otherwise asymptomatic, Not   requiring supplemental oxygen. This was incidentally noted without initial   stated respiratory complaints     COVID-19 virus infection-Has cough but otherwise asymptomatic.  SpO2 100%   on RA, Not requiring supplemental oxygen  -chest x ray no acute process  -asymptomatic  -no indication for treatment at this time  -CRP <0.29 normal     COVID-19 virus infection  -Has cough but otherwise asymptomatic  -SpO2 100% on RA, Not requiring supplemental oxygen  -asymptomatic  -no indication for treatment at this time  -CRP <0.29 normal  -repeated COVID 19 PCR positive on     Treatment: Droplet+ Isolation    Thank you,  Maxim Post RN  Clinical Documentation  [phone], or via [employer]  Options provided:  -- Active Covid-19 infection is being treated and/or evaluated
No results found for: FOL, RBCF   No results for input(s): PH, PCO2, PO2 in the last 72 hours. No results for input(s): CPK in the last 72 hours. Invalid input(s): CPKMB, CKNDX, TROIQ  No results found for: CHOL, CHOLX, CHLST, CHOLV, HDL, HDLC, LDL, LDLC, TGLX, TRIGL  No results found for: GLUCPOC  [unfilled]    Notes reviewed from all clinical/nonclinical/nursing services involved in patient's clinical care. Care coordination discussions were held with appropriate clinical/nonclinical/ nursing providers based on care coordination needs. Patients current active Medications were reviewed, considered, added and adjusted based on the clinical condition today. Home Medications were reconciled to the best of my ability given all available resources at the time of admission. Route is PO if not otherwise noted.       Admission Status:[id number]:::1}      Medications Reviewed:     Current Facility-Administered Medications   Medication Dose Route Frequency    divalproex (DEPAKOTE) DR tablet 250 mg  250 mg Oral 3 times per day    OLANZapine (ZYPREXA) tablet 2.5 mg  2.5 mg Oral Nightly    sodium chloride flush 0.9 % injection 5-40 mL  5-40 mL IntraVENous 2 times per day    sodium chloride flush 0.9 % injection 5-40 mL  5-40 mL IntraVENous PRN    0.9 % sodium chloride infusion   IntraVENous PRN    ondansetron (ZOFRAN-ODT) disintegrating tablet 4 mg  4 mg Oral Q8H PRN    Or    ondansetron (ZOFRAN) injection 4 mg  4 mg IntraVENous Q6H PRN    polyethylene glycol (GLYCOLAX) packet 17 g  17 g Oral Daily PRN    acetaminophen (TYLENOL) tablet 650 mg  650 mg Oral Q6H PRN    Or    acetaminophen (TYLENOL) suppository 650 mg  650 mg Rectal Q6H PRN    enoxaparin Sodium (LOVENOX) injection 30 mg  30 mg SubCUTAneous BID    levothyroxine (SYNTHROID) tablet 100 mcg  100 mcg Oral Daily    lisinopril (PRINIVIL;ZESTRIL) tablet 20 mg  20 mg Oral Daily    cefTRIAXone (ROCEPHIN) 1,000 mg in sterile water 10 mL IV syringe  1,000 mg

## 2023-06-09 NOTE — DISCHARGE SUMMARY
JVD, no meningeal signs  Chest: Clear to auscultation bilaterally   CVS: S1 S2 heard, Capillary refill less than 2 seconds  Abd: soft/ Non tender, non distended, BS physiological,   Ext: no clubbing, no cyanosis, no edema, brisk 2+ DP pulses  Neuro/Psych: pleasant mood and affect, CN 2-12 grossly intact, sensory grossly within normal limit, Strength 5/5 in all extremities, DTR 1+ x 4  Skin: warm     CHRONIC MEDICAL DIAGNOSES:      Greater than 31 minutes were spent with the patient on counseling and coordination of care    Signed:   Nanci Ayala MD  6/9/2023  5:52 PM

## 2023-06-28 ENCOUNTER — TRANSCRIBE ORDERS (OUTPATIENT)
Facility: HOSPITAL | Age: 73
End: 2023-06-28

## 2023-06-28 DIAGNOSIS — M79.89 LEG SWELLING: Primary | ICD-10-CM

## 2023-06-29 ENCOUNTER — HOSPITAL ENCOUNTER (OUTPATIENT)
Facility: HOSPITAL | Age: 73
Discharge: HOME | End: 2023-06-29
Payer: MEDICARE

## 2023-06-29 DIAGNOSIS — M79.89 LEG SWELLING: ICD-10-CM

## 2023-06-29 PROCEDURE — 93970 EXTREMITY STUDY: CPT

## 2023-09-10 ENCOUNTER — HOSPITAL ENCOUNTER (EMERGENCY)
Facility: HOSPITAL | Age: 73
Discharge: HOME | End: 2023-09-10
Attending: STUDENT IN AN ORGANIZED HEALTH CARE EDUCATION/TRAINING PROGRAM
Payer: MEDICARE

## 2023-09-10 ENCOUNTER — APPOINTMENT (OUTPATIENT)
Facility: HOSPITAL | Age: 73
End: 2023-09-10
Payer: MEDICARE

## 2023-09-10 VITALS
OXYGEN SATURATION: 100 % | SYSTOLIC BLOOD PRESSURE: 131 MMHG | DIASTOLIC BLOOD PRESSURE: 63 MMHG | BODY MASS INDEX: 24.94 KG/M2 | HEART RATE: 81 BPM | WEIGHT: 145.28 LBS | TEMPERATURE: 97.5 F | RESPIRATION RATE: 16 BRPM

## 2023-09-10 DIAGNOSIS — S06.0X0A CONCUSSION WITHOUT LOSS OF CONSCIOUSNESS, INITIAL ENCOUNTER: Primary | ICD-10-CM

## 2023-09-10 PROCEDURE — 70450 CT HEAD/BRAIN W/O DYE: CPT

## 2023-09-10 PROCEDURE — 72125 CT NECK SPINE W/O DYE: CPT

## 2023-09-10 PROCEDURE — 99284 EMERGENCY DEPT VISIT MOD MDM: CPT

## 2023-09-10 ASSESSMENT — PAIN SCALES - GENERAL: PAINLEVEL_OUTOF10: 9

## 2023-09-10 NOTE — ED NOTES
This rn at bedside for dc. Went over all papers with pt at time of dc. Pt verbalized understanding. Pt ambulatory at time of dc.       Marcos Duggan RN  09/10/23 4070

## 2023-09-10 NOTE — ED PROVIDER NOTES
[facility] EMERGENCY DEPT  EMERGENCY DEPARTMENT ENCOUNTER       Pt Name: Adela Yates  MRN: [id number]  [address] [facility] 1950  Date of evaluation: 9/10/2023  Provider: Jessie Quiles MD   PCP: Ele Mascorro MD  Note Started: 3:48 PM 9/10/23     CHIEF COMPLAINT       Chief Complaint   Patient presents with    Head Injury     Pt is here for xrays and scans, at advise from doctor. Most recent fall was three weeks ago, pt was walking up steps and hit head on slate at daughter's house-she lost her balance as she was on antibiotics. Did not break the skin or have bleeding or loc. Pt is not on a blood thinner. -pt also fell a week before that        HISTORY OF PRESENT ILLNESS: 1 or more elements      History From: Patient  None     Adela Yates is a 74 y.o. female who presents to the emergency department with headache which has been present for 3. Patient states she sustained 2 falls 3 weeks ago. Patient states that she lost her footing in the laundry room and struck the front of her head. She did not have loss of consciousness. She similarly hit her head after catching her foot falling up the stairs patient denies associated nausea or vomiting. She states she feels \"like my head is in a vice\" since her falls. She reports associated neck pain but states that this is chronic. Denies numbness/weakness/dizziness/vision changes     Nursing Notes were all reviewed and agreed with or any disagreements were addressed in the HPI. REVIEW OF SYSTEMS      Review of Systems     Positives and Pertinent negatives as per HPI. PAST HISTORY     Past Medical History:  Past Medical History:   Diagnosis Date    Arthritis     Headache     Ill-defined condition     gout    Joint pain          Past Surgical History:  Past Surgical History:   Procedure Laterality Date    ORTHOPEDIC SURGERY         Family History:  No family history on file.     Social History:  Social History     Tobacco Use    Smoking status: Light Smoker

## 2024-02-22 ENCOUNTER — OFFICE VISIT (OUTPATIENT)
Age: 74
End: 2024-02-22
Payer: MEDICARE

## 2024-02-22 VITALS
WEIGHT: 162 LBS | OXYGEN SATURATION: 99 % | BODY MASS INDEX: 27.66 KG/M2 | HEIGHT: 64 IN | RESPIRATION RATE: 16 BRPM | DIASTOLIC BLOOD PRESSURE: 68 MMHG | HEART RATE: 88 BPM | TEMPERATURE: 97.6 F | SYSTOLIC BLOOD PRESSURE: 136 MMHG

## 2024-02-22 DIAGNOSIS — R90.82 WHITE MATTER DISEASE, UNSPECIFIED: ICD-10-CM

## 2024-02-22 DIAGNOSIS — I67.9 SMALL VESSEL DISEASE, CEREBROVASCULAR: ICD-10-CM

## 2024-02-22 DIAGNOSIS — F31.89 MANIC DISORDER, RECURRENT EPISODE (HCC): Primary | ICD-10-CM

## 2024-02-22 DIAGNOSIS — Z78.9 MEDICATION INTOLERANCE: ICD-10-CM

## 2024-02-22 PROBLEM — T78.40XA SENSITIVITY TO MEDICATION: Status: RESOLVED | Noted: 2024-02-22 | Resolved: 2024-02-22

## 2024-02-22 PROBLEM — T78.40XA SENSITIVITY TO MEDICATION: Status: ACTIVE | Noted: 2024-02-22

## 2024-02-22 PROCEDURE — 99205 OFFICE O/P NEW HI 60 MIN: CPT | Performed by: PSYCHIATRY & NEUROLOGY

## 2024-02-22 PROCEDURE — 1123F ACP DISCUSS/DSCN MKR DOCD: CPT | Performed by: PSYCHIATRY & NEUROLOGY

## 2024-02-22 RX ORDER — ASPIRIN 81 MG/1
81 TABLET ORAL DAILY
Qty: 90 TABLET | Refills: 0 | Status: SHIPPED | OUTPATIENT
Start: 2024-02-22

## 2024-02-22 RX ORDER — ROSUVASTATIN CALCIUM 5 MG/1
5 TABLET, COATED ORAL DAILY
COMMUNITY
Start: 2024-01-11

## 2024-02-22 RX ORDER — LEVOTHYROXINE SODIUM 88 UG/1
TABLET ORAL
COMMUNITY
Start: 2024-01-12

## 2024-02-22 ASSESSMENT — PATIENT HEALTH QUESTIONNAIRE - PHQ9
SUM OF ALL RESPONSES TO PHQ QUESTIONS 1-9: 2
1. LITTLE INTEREST OR PLEASURE IN DOING THINGS: 1
SUM OF ALL RESPONSES TO PHQ QUESTIONS 1-9: 2
SUM OF ALL RESPONSES TO PHQ9 QUESTIONS 1 & 2: 2
2. FEELING DOWN, DEPRESSED OR HOPELESS: 1

## 2024-02-22 NOTE — PATIENT INSTRUCTIONS
As per discussion  I started you on 81 mg enteric-coated aspirin daily.  I sent a prescription to your pharmacy because that is often picked up by your insurance    Were going to do an MRA which looks at the blood vessels of your brain to see if there is any issues there that is causing some of the small strokes in your brain.  You are allergic to iodine but MRI and MRA dye is not iodine-based so it should not be an issue    We talked about issues related to manic phases and what triggers it and bottom line is as it is hard to identify that but just know that you will have a predisposition to having lauryn  I would recommend going under the care of a psychiatrist routinely so they get to know you and your behaviors and your personality and you can also come up with a plan for when lauryn starts to show itself    We have talked about possibly doing neuropsych testing just to see what your baseline cognitive status is related to the small strokes  And for now we have decided to table that            Office Policies    Phone calls/patient messages:  Please allow up to 24 hours for someone in the office to contact you about your call or message. Be mindful your provider may be out of the office or your message may require further review. We encourage you to use "[employer]" for your messages as this is a faster, more efficient way to communicate with our office    Medication Refills:  Prescription medications require up to 48 business hours to process. We encourage you to use "[employer]" for your refills.     For controlled medications: Please allow up to 72 business hours to process. Certain medications may require you to  a written prescription at our office.    NO narcotic/controlled medications will be prescribed after 4pm Monday through Friday or on weekends    Form/Paperwork Completion:  We ask that you allow 7-14 business days. You may also download your forms to "[employer]" to have your doctor print off.

## 2024-02-22 NOTE — ASSESSMENT & PLAN NOTE
Unclear etiology she has has had a history of multiple concussions  Patient is wondering if this could be causative.   There certainly can be a temporal correlation but causality is yet to be determined    Discussed with the patient and her daughter that she really should be under the care of psychiatry long-term so she can develop a relationship with her psychiatrist and a trust with her psychiatrist and come up with a plan of treatment for when she cycles into her bipolar lauryn events  It would also be appropriate to get a correct diagnosis for this patient as I do not think this is ever been established

## 2024-02-22 NOTE — PROGRESS NOTES
[facility]  [facility]  [address]  [place] [address]  Phone: [phone] fax: [phone]          Deisy French is a 73 y.o. female who presents today for the following:  Chief Complaint   Patient presents with    New Patient     Patient states that she feels like a lot of medications that she has been taking has been made her manic and very hyper.  She has been like she is on speed.  [facility] in case this happens again.           ASSESSMENT AND PLAN    1. Manic disorder, recurrent episode (HCC)  Assessment & Plan:  Unclear etiology she has has had a history of multiple concussions  Patient is wondering if this could be causative.   There certainly can be a temporal correlation but causality is yet to be determined    Discussed with the patient and her daughter that she really should be under the care of psychiatry long-term so she can develop a relationship with her psychiatrist and a trust with her psychiatrist and come up with a plan of treatment for when she cycles into her bipolar lauryn events  It would also be appropriate to get a correct diagnosis for this patient as I do not think this is ever been established   2. Small vessel disease, cerebrovascular  Assessment & Plan:  Moderate degree noted on CT scan.  In the past couple of years she did have an echocardiogram which did not show any significant pathology I do not think we need to repeat this presently  I would like to get CTA head neck and but she is allergic to iodine so we will order MRA without contrast for evaluation of stenosis or other pathology that could be contributing to these findings.     Patient to date as best we can tell has been asymptomatic  Recommend patient go on an enteric coated 81 mg aspirin daily and a prescription has been written and sent to her pharmacy    Patient is a bit repetitive but unclear etiology.  We did briefly discuss

## 2024-02-22 NOTE — ASSESSMENT & PLAN NOTE
Patient seems to have an ultra sensitivity to many medications.  This seems to be a barrier for treating acute episodes of medical conditions

## 2024-02-22 NOTE — ASSESSMENT & PLAN NOTE
Moderate degree noted on CT scan.  In the past couple of years she did have an echocardiogram which did not show any significant pathology I do not think we need to repeat this presently  I would like to get CTA head neck and but she is allergic to iodine so we will order MRA without contrast for evaluation of stenosis or other pathology that could be contributing to these findings.     Patient to date as best we can tell has been asymptomatic  Recommend patient go on an enteric coated 81 mg aspirin daily and a prescription has been written and sent to her pharmacy

## 2024-02-26 ENCOUNTER — TELEPHONE (OUTPATIENT)
Age: 74
End: 2024-02-26

## 2024-02-26 NOTE — TELEPHONE ENCOUNTER
Tarsha with [facility] called requesting clarification. Pt states that the order was for MRI with contrast. Tarsha states that the system is showing MRA. Please call for clarification at [phone]

## 2024-02-29 DIAGNOSIS — R90.82 WHITE MATTER DISEASE, UNSPECIFIED: ICD-10-CM

## 2024-02-29 DIAGNOSIS — I67.9 SMALL VESSEL DISEASE, CEREBROVASCULAR: Primary | ICD-10-CM

## 2024-02-29 NOTE — TELEPHONE ENCOUNTER
Spoke with Atiya in [facility].    Verified they have the correct order for MRA head without contrast.  Advised to inform pt she will NOT be getting Iodine.      Atiya will call pt to schedule MRA.    Alise Alcantar, ANP  You32 minutes ago (4:40 PM)       The MRA is ordered WITHOUT contrast  So the person is [facility] has given her misinformation  In addition dyes for MRAs and MRIs are not iodine-based    I have ordered the correct test and it is without contrast so it is not an issue

## 2024-02-29 NOTE — TELEPHONE ENCOUNTER
Patient called stating that she spoke with Tarsha villalobos/ [facility] and she was advised that the MRA scheduled uses iodine. Patient states that she is allergic to iodine and that Np  knows this. Pt is very frustrated and asked that we please figure out what is going on as she would like to get this MRA done asap. Pt advised it is ok to leave a detailed vm if we are unable to reach her. Please advise.

## 2024-03-22 ENCOUNTER — HOSPITAL ENCOUNTER (OUTPATIENT)
Facility: HOSPITAL | Age: 74
End: 2024-03-22
Payer: MEDICARE

## 2024-03-22 DIAGNOSIS — R90.82 WHITE MATTER DISEASE, UNSPECIFIED: ICD-10-CM

## 2024-03-22 DIAGNOSIS — I67.9 SMALL VESSEL DISEASE, CEREBROVASCULAR: ICD-10-CM

## 2024-03-22 PROCEDURE — 70544 MR ANGIOGRAPHY HEAD W/O DYE: CPT

## 2024-06-02 DIAGNOSIS — I67.9 SMALL VESSEL DISEASE, CEREBROVASCULAR: ICD-10-CM

## 2024-06-03 RX ORDER — ASPIRIN 81 MG/1
81 TABLET, COATED ORAL DAILY
Qty: 90 TABLET | Refills: 3 | Status: SHIPPED | OUTPATIENT
Start: 2024-06-03

## 2025-05-04 DIAGNOSIS — I67.9 SMALL VESSEL DISEASE, CEREBROVASCULAR: ICD-10-CM

## 2025-05-06 RX ORDER — ASPIRIN 81 MG/1
81 TABLET, COATED ORAL DAILY
Qty: 90 TABLET | Refills: 3 | OUTPATIENT
Start: 2025-05-06